# Patient Record
Sex: FEMALE | Race: WHITE | NOT HISPANIC OR LATINO | Employment: FULL TIME | URBAN - NONMETROPOLITAN AREA
[De-identification: names, ages, dates, MRNs, and addresses within clinical notes are randomized per-mention and may not be internally consistent; named-entity substitution may affect disease eponyms.]

---

## 2019-12-26 ENCOUNTER — APPOINTMENT (OUTPATIENT)
Dept: OCCUPATIONAL MEDICINE | Facility: OTHER | Age: 34
End: 2019-12-26

## 2019-12-26 PROCEDURE — 99000 SPECIMEN HANDLING OFFICE-LAB: CPT

## 2021-03-18 NOTE — PROGRESS NOTES
"    Assessment & Plan     1. Encounter to establish care    2. Pilar cyst  - GENERAL SURG ADULT REFERRAL for excision.     3. Near syncope  - EKG 12-lead complete w/read - (Clinic Performed) - normal  - CBC with platelets and differential - normal  - TSH with free T4 reflex - pending  - Comprehensive metabolic panel - pending  - *UA reflex to Microscopic and Culture - [facility]/[place] - normal  - HCG Qual, Urine ([id number]) - negative  - Declined zio patch for now.        Review of the result(s) of each unique test - EKG, UA, CBC, urine pregnancy.        Follow-up as needed       Kelsey Parra, NP  [facility] - [place]SE Jones is a 36 year old who presents for the following health issues     HPI     She is here today to establish care.  She has not had a primary in the past.  Her 2 concerns are lesion on scalp and near syncope 2 weeks ago.        New Patient/Transfer of Care  Concern - bump on head   Onset: 12 years ago   Description: getting larger  Intensity: gets irritated   Progression of Symptoms:  worsening  Accompanying Signs & Symptoms: firm irritation   Previous history of similar problem: no   Precipitating factors:        Worsened by: wearing shield   Alleviating factors:        Improved by: nothing   Therapies tried and outcome: None      2 weeks ago she had an episode where she felt very light headed, almost passed out.  She did not fall, did not lose consciousness just very lightheaded and vision closing in.  The whole episode only lasted a few seconds.          Review of Systems   Constitutional, HEENT, cardiovascular, pulmonary, gi and gu systems are negative, except as otherwise noted.      Objective    /72 (BP Location: Left arm, Patient Position: Chair, Cuff Size: Adult Large)   Pulse 77   Temp 97.2  F (36.2  C) (Tympanic)   Resp 16   Ht 1.854 m (6' 1\")   Wt 85.9 kg (189 lb 4.8 oz)   SpO2 97%   BMI 24.98 kg/m    Body mass index is 24.98 " kg/m .  Physical Exam   GENERAL: healthy, alert and no distress  HENT: ear canals and TM's normal, nose and mouth without ulcers or lesions  NECK: no adenopathy, no asymmetry, masses, or scars, thyroid normal to palpation and no carotid bruits  RESP: lungs clear to auscultation - no rales, rhonchi or wheezes  CV: regular rate and rhythm, normal S1 S2, no S3 or S4, no murmur, click or rub, no peripheral edema and peripheral pulses strong  ABDOMEN: soft, nontender, no hepatosplenomegaly, no masses and bowel sounds normal  MS: no gross musculoskeletal defects noted, no edema  NEURO: Normal strength and tone, mentation intact and speech normal  PSYCH: mentation appears normal, affect normal/bright        Results for orders placed or performed in visit on 03/25/21   CBC with platelets and differential     Status: None   Result Value Ref Range    WBC 6.6 4.0 - 11.0 10e9/L    RBC Count 4.72 3.8 - 5.2 10e12/L    Hemoglobin 14.7 11.7 - 15.7 g/dL    Hematocrit 43.1 35.0 - 47.0 %    MCV 91 78 - 100 fl    MCH 31.1 26.5 - 33.0 pg    MCHC 34.1 31.5 - 36.5 g/dL    RDW 12.8 10.0 - 15.0 %    Platelet Count 305 150 - 450 10e9/L    % Neutrophils 54.9 %    % Lymphocytes 31.9 %    % Monocytes 8.2 %    % Eosinophils 4.1 %    % Basophils 0.9 %    Absolute Neutrophil 3.6 1.6 - 8.3 10e9/L    Absolute Lymphocytes 2.1 0.8 - 5.3 10e9/L    Absolute Monocytes 0.5 0.0 - 1.3 10e9/L    Absolute Eosinophils 0.3 0.0 - 0.7 10e9/L    Absolute Basophils 0.1 0.0 - 0.2 10e9/L    Diff Method Automated Method    TSH with free T4 reflex     Status: None   Result Value Ref Range    TSH 1.35 0.40 - 4.00 mU/L   Comprehensive metabolic panel     Status: None   Result Value Ref Range    Sodium 136 133 - 144 mmol/L    Potassium 3.6 3.4 - 5.3 mmol/L    Chloride 102 94 - 109 mmol/L    Carbon Dioxide 27 20 - 32 mmol/L    Anion Gap 7 3 - 14 mmol/L    Glucose 88 70 - 99 mg/dL    Urea Nitrogen 12 7 - 30 mg/dL    Creatinine 0.89 0.52 - 1.04 mg/dL    GFR Estimate 82 >60  mL/min/[1.73_m2]    GFR Estimate If Black >90 >60 mL/min/[1.73_m2]    Calcium 9.1 8.5 - 10.1 mg/dL    Bilirubin Total 0.9 0.2 - 1.3 mg/dL    Albumin 4.3 3.4 - 5.0 g/dL    Protein Total 7.6 6.8 - 8.8 g/dL    Alkaline Phosphatase 51 40 - 150 U/L    ALT 22 0 - 50 U/L    AST 9 0 - 45 U/L   *UA reflex to Microscopic and Culture - [place] [facility]     Status: Abnormal    Specimen: Midstream Urine   Result Value Ref Range    Color Urine Yellow     Appearance Urine Clear     Glucose Urine Negative NEG^Negative mg/dL    Bilirubin Urine Negative NEG^Negative    Ketones Urine Negative NEG^Negative mg/dL    Specific Gravity Urine 1.010 1.003 - 1.035    Blood Urine Negative NEG^Negative    pH Urine 7.0 5.0 - 7.0 pH    Protein Albumin Urine Negative NEG^Negative mg/dL    Urobilinogen Urine 0.2 0.2 - 1.0 EU/dL    Nitrite Urine Negative NEG^Negative    Leukocyte Esterase Urine Trace (A) NEG^Negative    Source Midstream Urine    HCG Qual, Urine ([id number])     Status: None   Result Value Ref Range    HCG Qual Urine Negative NEG^Negative   Urine Microscopic     Status: None   Result Value Ref Range    WBC Urine 0 - 5 OTO5^0 - 5 /HPF    RBC Urine O - 2 OTO2^O - 2 /HPF    Squamous Epithelial /LPF Urine Few FEW^Few /LPF          EKG Interpretation:      Interpreted by Kelsey Parra NP    Symptoms at time of EKG: None   Rhythm: Normal sinus   Rate: 68  Axis: Normal  Ectopy: None  Conduction: Normal  ST Segments/ T Waves: No ST-T wave changes and No acute ischemic changes  Q Waves: None  Comparison to prior: No old EKG available    Clinical Impression: no acute changes

## 2021-03-25 ENCOUNTER — OFFICE VISIT (OUTPATIENT)
Dept: FAMILY MEDICINE | Facility: OTHER | Age: 36
End: 2021-03-25
Attending: NURSE PRACTITIONER
Payer: COMMERCIAL

## 2021-03-25 VITALS
TEMPERATURE: 97.2 F | OXYGEN SATURATION: 97 % | WEIGHT: 189.3 LBS | HEART RATE: 77 BPM | DIASTOLIC BLOOD PRESSURE: 72 MMHG | HEIGHT: 72 IN | BODY MASS INDEX: 25.64 KG/M2 | SYSTOLIC BLOOD PRESSURE: 116 MMHG | RESPIRATION RATE: 16 BRPM

## 2021-03-25 DIAGNOSIS — L72.11 PILAR CYST: ICD-10-CM

## 2021-03-25 DIAGNOSIS — Z76.89 ENCOUNTER TO ESTABLISH CARE: Primary | ICD-10-CM

## 2021-03-25 DIAGNOSIS — R55 NEAR SYNCOPE: ICD-10-CM

## 2021-03-25 LAB
ALBUMIN SERPL-MCNC: 4.3 G/DL (ref 3.4–5)
ALBUMIN UR-MCNC: NEGATIVE MG/DL
ALP SERPL-CCNC: 51 U/L (ref 40–150)
ALT SERPL W P-5'-P-CCNC: 22 U/L (ref 0–50)
ANION GAP SERPL CALCULATED.3IONS-SCNC: 7 MMOL/L (ref 3–14)
APPEARANCE UR: CLEAR
AST SERPL W P-5'-P-CCNC: 9 U/L (ref 0–45)
BASOPHILS # BLD AUTO: 0.1 10E9/L (ref 0–0.2)
BASOPHILS NFR BLD AUTO: 0.9 %
BILIRUB SERPL-MCNC: 0.9 MG/DL (ref 0.2–1.3)
BILIRUB UR QL STRIP: NEGATIVE
BUN SERPL-MCNC: 12 MG/DL (ref 7–30)
CALCIUM SERPL-MCNC: 9.1 MG/DL (ref 8.5–10.1)
CHLORIDE SERPL-SCNC: 102 MMOL/L (ref 94–109)
CO2 SERPL-SCNC: 27 MMOL/L (ref 20–32)
COLOR UR AUTO: YELLOW
CREAT SERPL-MCNC: 0.89 MG/DL (ref 0.52–1.04)
DIFFERENTIAL METHOD BLD: NORMAL
EOSINOPHIL # BLD AUTO: 0.3 10E9/L (ref 0–0.7)
EOSINOPHIL NFR BLD AUTO: 4.1 %
ERYTHROCYTE [DISTWIDTH] IN BLOOD BY AUTOMATED COUNT: 12.8 % (ref 10–15)
GFR SERPL CREATININE-BSD FRML MDRD: 82 ML/MIN/{1.73_M2}
GLUCOSE SERPL-MCNC: 88 MG/DL (ref 70–99)
GLUCOSE UR STRIP-MCNC: NEGATIVE MG/DL
HCG UR QL: NEGATIVE
HCT VFR BLD AUTO: 43.1 % (ref 35–47)
HGB BLD-MCNC: 14.7 G/DL (ref 11.7–15.7)
HGB UR QL STRIP: NEGATIVE
KETONES UR STRIP-MCNC: NEGATIVE MG/DL
LEUKOCYTE ESTERASE UR QL STRIP: ABNORMAL
LYMPHOCYTES # BLD AUTO: 2.1 10E9/L (ref 0.8–5.3)
LYMPHOCYTES NFR BLD AUTO: 31.9 %
MCH RBC QN AUTO: 31.1 PG (ref 26.5–33)
MCHC RBC AUTO-ENTMCNC: 34.1 G/DL (ref 31.5–36.5)
MCV RBC AUTO: 91 FL (ref 78–100)
MONOCYTES # BLD AUTO: 0.5 10E9/L (ref 0–1.3)
MONOCYTES NFR BLD AUTO: 8.2 %
NEUTROPHILS # BLD AUTO: 3.6 10E9/L (ref 1.6–8.3)
NEUTROPHILS NFR BLD AUTO: 54.9 %
NITRATE UR QL: NEGATIVE
NON-SQ EPI CELLS #/AREA URNS LPF: NORMAL /LPF
PH UR STRIP: 7 PH (ref 5–7)
PLATELET # BLD AUTO: 305 10E9/L (ref 150–450)
POTASSIUM SERPL-SCNC: 3.6 MMOL/L (ref 3.4–5.3)
PROT SERPL-MCNC: 7.6 G/DL (ref 6.8–8.8)
RBC # BLD AUTO: 4.72 10E12/L (ref 3.8–5.2)
RBC #/AREA URNS AUTO: NORMAL /HPF
SODIUM SERPL-SCNC: 136 MMOL/L (ref 133–144)
SOURCE: ABNORMAL
SP GR UR STRIP: 1.01 (ref 1–1.03)
TSH SERPL DL<=0.005 MIU/L-ACNC: 1.35 MU/L (ref 0.4–4)
UROBILINOGEN UR STRIP-ACNC: 0.2 EU/DL (ref 0.2–1)
WBC # BLD AUTO: 6.6 10E9/L (ref 4–11)
WBC #/AREA URNS AUTO: NORMAL /HPF

## 2021-03-25 PROCEDURE — 80050 GENERAL HEALTH PANEL: CPT | Performed by: NURSE PRACTITIONER

## 2021-03-25 PROCEDURE — 81025 URINE PREGNANCY TEST: CPT | Performed by: NURSE PRACTITIONER

## 2021-03-25 PROCEDURE — 93000 ELECTROCARDIOGRAM COMPLETE: CPT | Performed by: INTERNAL MEDICINE

## 2021-03-25 PROCEDURE — 36415 COLL VENOUS BLD VENIPUNCTURE: CPT | Performed by: NURSE PRACTITIONER

## 2021-03-25 PROCEDURE — 81001 URINALYSIS AUTO W/SCOPE: CPT | Performed by: NURSE PRACTITIONER

## 2021-03-25 PROCEDURE — 99203 OFFICE O/P NEW LOW 30 MIN: CPT | Performed by: NURSE PRACTITIONER

## 2021-03-25 SDOH — HEALTH STABILITY: MENTAL HEALTH: HOW OFTEN DO YOU HAVE 6 OR MORE DRINKS ON ONE OCCASION?: NOT ASKED

## 2021-03-25 SDOH — HEALTH STABILITY: MENTAL HEALTH: HOW MANY STANDARD DRINKS CONTAINING ALCOHOL DO YOU HAVE ON A TYPICAL DAY?: NOT ASKED

## 2021-03-25 SDOH — HEALTH STABILITY: MENTAL HEALTH: HOW OFTEN DO YOU HAVE A DRINK CONTAINING ALCOHOL?: MONTHLY OR LESS

## 2021-03-25 ASSESSMENT — ANXIETY QUESTIONNAIRES
2. NOT BEING ABLE TO STOP OR CONTROL WORRYING: NOT AT ALL
3. WORRYING TOO MUCH ABOUT DIFFERENT THINGS: NOT AT ALL
6. BECOMING EASILY ANNOYED OR IRRITABLE: NOT AT ALL
5. BEING SO RESTLESS THAT IT IS HARD TO SIT STILL: NOT AT ALL
1. FEELING NERVOUS, ANXIOUS, OR ON EDGE: NOT AT ALL
7. FEELING AFRAID AS IF SOMETHING AWFUL MIGHT HAPPEN: NOT AT ALL
GAD7 TOTAL SCORE: 0
4. TROUBLE RELAXING: NOT AT ALL
IF YOU CHECKED OFF ANY PROBLEMS ON THIS QUESTIONNAIRE, HOW DIFFICULT HAVE THESE PROBLEMS MADE IT FOR YOU TO DO YOUR WORK, TAKE CARE OF THINGS AT HOME, OR GET ALONG WITH OTHER PEOPLE: NOT DIFFICULT AT ALL

## 2021-03-25 ASSESSMENT — MIFFLIN-ST. JEOR: SCORE: 1671.54

## 2021-03-25 ASSESSMENT — PAIN SCALES - GENERAL: PAINLEVEL: NO PAIN (0)

## 2021-03-25 ASSESSMENT — PATIENT HEALTH QUESTIONNAIRE - PHQ9: SUM OF ALL RESPONSES TO PHQ QUESTIONS 1-9: 2

## 2021-03-25 NOTE — NURSING NOTE
"Chief Complaint   Patient presents with     Establish Care       Initial /72 (BP Location: Left arm, Patient Position: Chair, Cuff Size: Adult Large)   Pulse 77   Temp 97.2  F (36.2  C) (Tympanic)   Resp 16   Ht 1.854 m (6' 1\")   Wt 85.9 kg (189 lb 4.8 oz)   SpO2 97%   BMI 24.98 kg/m   Estimated body mass index is 24.98 kg/m  as calculated from the following:    Height as of this encounter: 1.854 m (6' 1\").    Weight as of this encounter: 85.9 kg (189 lb 4.8 oz).  Medication Reconciliation: complete  Pamela M. Lechevalier, LPN    "

## 2021-03-25 NOTE — PATIENT INSTRUCTIONS
Assessment & Plan     1. Encounter to establish care      2. Pilar cyst  - GENERAL SURG ADULT REFERRAL    3. Near syncope  - EKG 12-lead complete w/read - (Clinic Performed) - normal  - CBC with platelets and differential - normal  - TSH with free T4 reflex - pending  - Comprehensive metabolic panel - pending  - *UA reflex to Microscopic and Culture - [facility]/[place] - normal  - HCG Qual, Urine ([id number]) - negative  - Declined zio patch for now.        Review of the result(s) of each unique test - EKG, UA, CBC      Follow-up as needed       Kelsey Parra NP  [facility] - [place] [facility]

## 2021-03-26 ASSESSMENT — ANXIETY QUESTIONNAIRES: GAD7 TOTAL SCORE: 0

## 2021-04-13 ENCOUNTER — OFFICE VISIT (OUTPATIENT)
Dept: SURGERY | Facility: OTHER | Age: 36
End: 2021-04-13
Attending: NURSE PRACTITIONER
Payer: COMMERCIAL

## 2021-04-13 ENCOUNTER — PREP FOR PROCEDURE (OUTPATIENT)
Dept: SURGERY | Facility: OTHER | Age: 36
End: 2021-04-13

## 2021-04-13 VITALS
TEMPERATURE: 97.8 F | OXYGEN SATURATION: 98 % | BODY MASS INDEX: 25.06 KG/M2 | HEART RATE: 78 BPM | DIASTOLIC BLOOD PRESSURE: 72 MMHG | RESPIRATION RATE: 16 BRPM | WEIGHT: 185 LBS | HEIGHT: 72 IN | SYSTOLIC BLOOD PRESSURE: 122 MMHG

## 2021-04-13 DIAGNOSIS — L98.9 SCALP LESION: Primary | ICD-10-CM

## 2021-04-13 DIAGNOSIS — L72.11 PILAR CYST: ICD-10-CM

## 2021-04-13 DIAGNOSIS — Z01.818 PREOP TESTING: Primary | ICD-10-CM

## 2021-04-13 PROCEDURE — 99203 OFFICE O/P NEW LOW 30 MIN: CPT | Performed by: SURGERY

## 2021-04-13 ASSESSMENT — PAIN SCALES - GENERAL: PAINLEVEL: NO PAIN (0)

## 2021-04-13 ASSESSMENT — MIFFLIN-ST. JEOR: SCORE: 1652.03

## 2021-04-13 NOTE — NURSING NOTE
"Chief Complaint   Patient presents with     Consult     referred by Bautista Asif for cyst on head for 12 years getting bigger       Initial /72 (BP Location: Right arm, Cuff Size: Adult Regular)   Pulse 78   Temp 97.8  F (36.6  C) (Tympanic)   Resp 16   Ht 1.854 m (6' 1\")   Wt 83.9 kg (185 lb)   SpO2 98%   BMI 24.41 kg/m   Estimated body mass index is 24.41 kg/m  as calculated from the following:    Height as of this encounter: 1.854 m (6' 1\").    Weight as of this encounter: 83.9 kg (185 lb).  Medication Reconciliation: complete  Vero Samayoa LPN  "

## 2021-04-13 NOTE — PATIENT INSTRUCTIONS
Thank you for allowing our surgical team to participate in your care. Please review the instructions below.   If you have questions, you may contact us at the any of the following numbers:     [facility] Coordinator: [phone]  Clinic Surgery Nurse ([place]): [phone]  Hospital Surgery Education Nurse: [phone]    You are scheduled for: Excision of Scalp Lesion with Dr. Bahena on 5/10/21.  Admit Time: Hospital Surgery will call you the day before your procedure by 5pm with your arrival time.   If your surgery is on Monday, expect a call on Friday.   If you are not contacted before 5PM, please call admitting at [phone].   After hours or on weekends, please call [phone] to postpone.   Call the clinic surgery nurse if you become ill within 1-2 weeks of your procedure to reschedule.   This includes fever, chills, sore throat, cough, chest congestion, runny nose, or any other symptom of any other illness.     COVID-19 test is needed 4-5 days before procedure in the morning. This testing is done in the [facility] on the [place] side of [facility] (weekdays and weekends) or at the [facility] through door #3 (weekdays only).  Follow the signage for parking and bring your mobile phone (if you have one) to call the phone number ([phone]) on the sign outside the testing site for check-in. Stay in your vehicle until you are directed to enter. If you do not have a cell phone, please call the nurse for instructions on checking in.   This has been scheduled for 5/6/21 at 10:30 at the [place] site.      Please call the Hospital Surgery Education Nurse at [phone] 1 week prior to your procedure and have a medication list ready.   Do not take Aspirin or other NSAIDS (Ibuprofen, Motrin, Aleve, Celebrex, Naproxen, etc), vitamins/supplements 7 days before your surgery unless you have been otherwise directed.   If you are prescribed a daily 81mg Aspirin, you may continue this.    If you are prescribed blood thinners or insulin, please contact your primary care provider for instructions.    Do not have anything to eat or drink after midnight the night before your surgery (or 8 hours prior to surgery), except clear liquids (water, clear juice, clear broth, plain coffee or tea without cream or milk) up until 2 hours prior to arrival time, and then nothing by mouth.   Do not eat, drink, chew gum, suck on hard candy, or smoke after 2 hours prior to arrival. You can brush your teeth.   If you are directed to take any medications, take them with a tiny sip of water.   If you use an inhaler, bring it with you.  Arrive in clean, comfortable clothing.   Do not wear any jewelry, make-up, nail polish, lotions, hair products, or perfumes.   [place] in hospital admitting through the [place] entrance.  A responsible adult must be available to drive you home and stay with you for 24 hours at home. If you need to take a taxi or the bus, you must have another responsible adult to ride with you. Your procedure will be cancelled if you do not have a responsible adult .     Return to clinic for a postop appointment 2 week(s) from your surgery date.

## 2021-04-13 NOTE — H&P (VIEW-ONLY)
"CLINIC NOTE - CONSULT  4/13/2021    Patient:Karen Lanier    Referring Physician: Kelsey Parra    Reason for Referral: Mass on scalp    This is a 36 year old female with a mass on the scalp.  The mass is getting larger.  The mass has not been infected in the past.  The mass has not drained.  The patient does desire excision.     Past Medical History:  History reviewed. No pertinent past medical history.    Past Surgical History:  History reviewed. No pertinent surgical history.    Family History History:  Family History   Problem Relation Age of Onset     Throat cancer Father      Melanoma Maternal Grandfather      Coronary Artery Disease Paternal Grandfather      Heart Disease Paternal Grandfather      Brain Cancer Brother        History of Tobacco Use:  History   Smoking Status     Never Smoker   Smokeless Tobacco     Never Used       Current Medications:  No current outpatient medications on file.       Allergies:  No Known Allergies    ROS:  Pertinent items are noted in HPI.  All other systems are negative.    PHYSICAL EXAM:     Vital signs: /72 (BP Location: Right arm, Cuff Size: Adult Regular)   Pulse 78   Temp 97.8  F (36.6  C) (Tympanic)   Resp 16   Ht 1.854 m (6' 1\")   Wt 83.9 kg (185 lb)   SpO2 98%   BMI 24.41 kg/m     Weight: [unfilled]   BMI: Body mass index is 24.41 kg/m .   General: Normal, healthy, cooperative   Skin: no jaundice   HEENT: PERRLA and EOMI   Neck: supple   Lungs: clear to auscultation   CV: Regular rate and rhythm without murmer   Abdominal: abdomen is soft without significant tenderness, masses, organomegaly or guarding   Extremities: No cyanosis, clubbing or edema noted bilaterally in Upper and Lower Extremities   Neurological: without deficit     On the anterior scalp there is a 5 x 5 cm soft mobile lesion consistent with a pilar cyst.    ASSESSMENT: 36 year old female with a mass on the scalp.    PLAN: Discussed options with the patient.  Will plan on " taking the patient to the OR for surgical excision.      The risks, benefits, and alternatives to the planned procedure were fully discussed with the patient and/or the patient's representative(s). The risks of bleeding, infection, death, missing pathology, the need for additional procedures intra-operatively, the possible need for intra-operative consults, the possible need for transfusion therapy, cardiopulmonary compromise, the possible need for additional surgery for a complication were discussed with the patient and/or the patient's representative(s). The patient's and/or patient's representative(s) questions were addressed and answered. Informed consent was obtained from the patient and/or the patient's representative(s). The patient and/or the patient's representative(s) consent to proceed.

## 2021-04-24 ENCOUNTER — HEALTH MAINTENANCE LETTER (OUTPATIENT)
Age: 36
End: 2021-04-24

## 2021-05-03 ENCOUNTER — ANESTHESIA EVENT (OUTPATIENT)
Dept: SURGERY | Facility: HOSPITAL | Age: 36
End: 2021-05-03
Payer: COMMERCIAL

## 2021-05-03 NOTE — ANESTHESIA PREPROCEDURE EVALUATION
Anesthesia Pre-Procedure Evaluation    Patient: Karen Lanier   MRN: [id number] : 1985        Preoperative Diagnosis: Scalp lesion [L98.9]   Procedure : Procedure(s):  Excision of Scalp Lesion     No past medical history on file.   No past surgical history on file.   No Known Allergies   Social History     Tobacco Use     Smoking status: Never Smoker     Smokeless tobacco: Never Used   Substance Use Topics     Alcohol Use     Frequency: Monthly or less      Wt Readings from Last 1 Encounters:   21 83.9 kg (185 lb)        Anesthesia Evaluation   Pt has not had prior anesthetic         ROS/MED HX  ENT/Pulmonary:  - neg pulmonary ROS     Neurologic:  - neg neurologic ROS     Cardiovascular:  - neg cardiovascular ROS     METS/Exercise Tolerance: >4 METS    Hematologic:  - neg hematologic  ROS     Musculoskeletal:       GI/Hepatic:  - neg GI/hepatic ROS     Renal/Genitourinary:  - neg Renal ROS     Endo:  - neg endo ROS     Psychiatric/Substance Use:  - neg psychiatric ROS     Infectious Disease:  - neg infectious disease ROS     Malignancy: Comment: Mass on scalp - neg malignancy ROS     Other:  - neg other ROS          Physical Exam    Airway        Mallampati: I   TM distance: > 3 FB   Neck ROM: full   Mouth opening: > 3 cm    Respiratory Devices and Support         Dental  no notable dental history         Cardiovascular   cardiovascular exam normal       Rhythm and rate: regular and normal     Pulmonary   pulmonary exam normal        breath sounds clear to auscultation           OUTSIDE LABS:  CBC:   Lab Results   Component Value Date    WBC 6.6 2021    HGB 14.7 2021    HCT 43.1 2021     2021     BMP:   Lab Results   Component Value Date     2021    POTASSIUM 3.6 2021    CHLORIDE 102 2021    CO2 27 2021    BUN 12 2021    CR 0.89 2021    GLC 88 2021     COAGS: No results found for: PTT, INR, FIBR  POC:   Lab Results    Component Value Date    HCG Negative 03/25/2021     HEPATIC:   Lab Results   Component Value Date    ALBUMIN 4.3 03/25/2021    PROTTOTAL 7.6 03/25/2021    ALT 22 03/25/2021    AST 9 03/25/2021    ALKPHOS 51 03/25/2021    BILITOTAL 0.9 03/25/2021     OTHER:   Lab Results   Component Value Date    DANIEL 9.1 03/25/2021    TSH 1.35 03/25/2021       Anesthesia Plan    ASA Status:  1   NPO Status:  NPO Appropriate    Anesthesia Type: MAC.     - Reason for MAC: straight local not clinically adequate   Induction: Intravenous.   Maintenance: TIVA.        Consents    Anesthesia Plan(s) and associated risks, benefits, and realistic alternatives discussed. Questions answered and patient/representative(s) expressed understanding.     - Discussed with:  Patient         Postoperative Care            Comments:    Consult note 4/13/21  Risks and benefits of MAC anesthetic discussed including dental damage, aspiration, loss of airway, conversion to general anesthetic, CV complications, MI, stroke, death. Pt wishes to proceed.             VITOR Nelson CRNA

## 2021-05-06 ENCOUNTER — OFFICE VISIT (OUTPATIENT)
Dept: FAMILY MEDICINE | Facility: OTHER | Age: 36
End: 2021-05-06
Attending: NURSE PRACTITIONER
Payer: COMMERCIAL

## 2021-05-06 DIAGNOSIS — Z01.818 PREOP TESTING: ICD-10-CM

## 2021-05-06 LAB
SARS-COV-2 RNA RESP QL NAA+PROBE: NORMAL
SPECIMEN SOURCE: NORMAL

## 2021-05-06 PROCEDURE — U0005 INFEC AGEN DETEC AMPLI PROBE: HCPCS | Performed by: FAMILY MEDICINE

## 2021-05-06 PROCEDURE — U0003 INFECTIOUS AGENT DETECTION BY NUCLEIC ACID (DNA OR RNA); SEVERE ACUTE RESPIRATORY SYNDROME CORONAVIRUS 2 (SARS-COV-2) (CORONAVIRUS DISEASE [COVID-19]), AMPLIFIED PROBE TECHNIQUE, MAKING USE OF HIGH THROUGHPUT TECHNOLOGIES AS DESCRIBED BY CMS-2020-01-R: HCPCS | Performed by: FAMILY MEDICINE

## 2021-05-07 LAB
LABORATORY COMMENT REPORT: NORMAL
SARS-COV-2 RNA RESP QL NAA+PROBE: NEGATIVE
SPECIMEN SOURCE: NORMAL

## 2021-05-10 ENCOUNTER — APPOINTMENT (OUTPATIENT)
Dept: LAB | Facility: HOSPITAL | Age: 36
End: 2021-05-10
Attending: NURSE PRACTITIONER
Payer: COMMERCIAL

## 2021-05-10 ENCOUNTER — ANESTHESIA (OUTPATIENT)
Dept: SURGERY | Facility: HOSPITAL | Age: 36
End: 2021-05-10
Payer: COMMERCIAL

## 2021-05-10 ENCOUNTER — HOSPITAL ENCOUNTER (OUTPATIENT)
Facility: HOSPITAL | Age: 36
Discharge: HOME | End: 2021-05-10
Attending: SURGERY | Admitting: SURGERY
Payer: COMMERCIAL

## 2021-05-10 VITALS
OXYGEN SATURATION: 100 % | BODY MASS INDEX: 25.06 KG/M2 | HEART RATE: 66 BPM | RESPIRATION RATE: 16 BRPM | SYSTOLIC BLOOD PRESSURE: 118 MMHG | HEIGHT: 72 IN | TEMPERATURE: 97.2 F | DIASTOLIC BLOOD PRESSURE: 74 MMHG | WEIGHT: 185 LBS

## 2021-05-10 DIAGNOSIS — L98.9 SCALP LESION: ICD-10-CM

## 2021-05-10 LAB — HCG UR QL: NEGATIVE

## 2021-05-10 PROCEDURE — 250N000011 HC RX IP 250 OP 636: Performed by: NURSE ANESTHETIST, CERTIFIED REGISTERED

## 2021-05-10 PROCEDURE — 999N000141 HC STATISTIC PRE-PROCEDURE NURSING ASSESSMENT: Performed by: SURGERY

## 2021-05-10 PROCEDURE — 11422 EXC H-F-NK-SP B9+MARG 1.1-2: CPT | Performed by: SURGERY

## 2021-05-10 PROCEDURE — 250N000009 HC RX 250: Performed by: SURGERY

## 2021-05-10 PROCEDURE — 250N000011 HC RX IP 250 OP 636: Performed by: SURGERY

## 2021-05-10 PROCEDURE — 88305 TISSUE EXAM BY PATHOLOGIST: CPT | Mod: TC | Performed by: SURGERY

## 2021-05-10 PROCEDURE — 21013 EXC FACE TUM DEEP < 2 CM: CPT | Performed by: NURSE ANESTHETIST, CERTIFIED REGISTERED

## 2021-05-10 PROCEDURE — 81025 URINE PREGNANCY TEST: CPT | Performed by: NURSE ANESTHETIST, CERTIFIED REGISTERED

## 2021-05-10 PROCEDURE — 250N000013 HC RX MED GY IP 250 OP 250 PS 637: Performed by: NURSE ANESTHETIST, CERTIFIED REGISTERED

## 2021-05-10 PROCEDURE — 360N000075 HC SURGERY LEVEL 2, PER MIN: Performed by: SURGERY

## 2021-05-10 PROCEDURE — 258N000003 HC RX IP 258 OP 636: Performed by: NURSE ANESTHETIST, CERTIFIED REGISTERED

## 2021-05-10 PROCEDURE — 272N000001 HC OR GENERAL SUPPLY STERILE: Performed by: SURGERY

## 2021-05-10 PROCEDURE — 250N000009 HC RX 250: Performed by: NURSE ANESTHETIST, CERTIFIED REGISTERED

## 2021-05-10 PROCEDURE — 710N000012 HC RECOVERY PHASE 2, PER MINUTE: Performed by: SURGERY

## 2021-05-10 PROCEDURE — 370N000017 HC ANESTHESIA TECHNICAL FEE, PER MIN: Performed by: SURGERY

## 2021-05-10 RX ORDER — CEFAZOLIN SODIUM 2 G/100ML
2 INJECTION, SOLUTION INTRAVENOUS SEE ADMIN INSTRUCTIONS
Status: DISCONTINUED | OUTPATIENT
Start: 2021-05-10 | End: 2021-05-10 | Stop reason: HOSPADM

## 2021-05-10 RX ORDER — ACETAMINOPHEN 325 MG/1
650 TABLET ORAL ONCE
Status: COMPLETED | OUTPATIENT
Start: 2021-05-10 | End: 2021-05-10

## 2021-05-10 RX ORDER — BACITRACIN ZINC 500 [USP'U]/G
OINTMENT TOPICAL PRN
Status: DISCONTINUED | OUTPATIENT
Start: 2021-05-10 | End: 2021-05-10 | Stop reason: HOSPADM

## 2021-05-10 RX ORDER — MEPERIDINE HYDROCHLORIDE 25 MG/ML
12.5 INJECTION INTRAMUSCULAR; INTRAVENOUS; SUBCUTANEOUS
Status: DISCONTINUED | OUTPATIENT
Start: 2021-05-10 | End: 2021-05-10 | Stop reason: HOSPADM

## 2021-05-10 RX ORDER — FENTANYL CITRATE 50 UG/ML
INJECTION, SOLUTION INTRAMUSCULAR; INTRAVENOUS PRN
Status: DISCONTINUED | OUTPATIENT
Start: 2021-05-10 | End: 2021-05-10

## 2021-05-10 RX ORDER — ONDANSETRON 4 MG/1
4 TABLET, ORALLY DISINTEGRATING ORAL EVERY 30 MIN PRN
Status: DISCONTINUED | OUTPATIENT
Start: 2021-05-10 | End: 2021-05-10 | Stop reason: HOSPADM

## 2021-05-10 RX ORDER — NALOXONE HYDROCHLORIDE 0.4 MG/ML
0.2 INJECTION, SOLUTION INTRAMUSCULAR; INTRAVENOUS; SUBCUTANEOUS
Status: DISCONTINUED | OUTPATIENT
Start: 2021-05-10 | End: 2021-05-10 | Stop reason: HOSPADM

## 2021-05-10 RX ORDER — ONDANSETRON 2 MG/ML
4 INJECTION INTRAMUSCULAR; INTRAVENOUS EVERY 30 MIN PRN
Status: DISCONTINUED | OUTPATIENT
Start: 2021-05-10 | End: 2021-05-10 | Stop reason: HOSPADM

## 2021-05-10 RX ORDER — SODIUM CHLORIDE, SODIUM LACTATE, POTASSIUM CHLORIDE, CALCIUM CHLORIDE 600; 310; 30; 20 MG/100ML; MG/100ML; MG/100ML; MG/100ML
INJECTION, SOLUTION INTRAVENOUS CONTINUOUS
Status: DISCONTINUED | OUTPATIENT
Start: 2021-05-10 | End: 2021-05-10 | Stop reason: HOSPADM

## 2021-05-10 RX ORDER — KETOROLAC TROMETHAMINE 30 MG/ML
30 INJECTION, SOLUTION INTRAMUSCULAR; INTRAVENOUS EVERY 6 HOURS PRN
Status: DISCONTINUED | OUTPATIENT
Start: 2021-05-10 | End: 2021-05-10 | Stop reason: HOSPADM

## 2021-05-10 RX ORDER — LIDOCAINE 40 MG/G
CREAM TOPICAL
Status: DISCONTINUED | OUTPATIENT
Start: 2021-05-10 | End: 2021-05-10 | Stop reason: HOSPADM

## 2021-05-10 RX ORDER — NALOXONE HYDROCHLORIDE 0.4 MG/ML
0.4 INJECTION, SOLUTION INTRAMUSCULAR; INTRAVENOUS; SUBCUTANEOUS
Status: DISCONTINUED | OUTPATIENT
Start: 2021-05-10 | End: 2021-05-10 | Stop reason: HOSPADM

## 2021-05-10 RX ORDER — PROPOFOL 10 MG/ML
INJECTION, EMULSION INTRAVENOUS CONTINUOUS PRN
Status: DISCONTINUED | OUTPATIENT
Start: 2021-05-10 | End: 2021-05-10

## 2021-05-10 RX ORDER — CEFAZOLIN SODIUM 2 G/100ML
2 INJECTION, SOLUTION INTRAVENOUS
Status: DISCONTINUED | OUTPATIENT
Start: 2021-05-10 | End: 2021-05-10 | Stop reason: HOSPADM

## 2021-05-10 RX ADMIN — SODIUM CHLORIDE, POTASSIUM CHLORIDE, SODIUM LACTATE AND CALCIUM CHLORIDE: 600; 310; 30; 20 INJECTION, SOLUTION INTRAVENOUS at 07:45

## 2021-05-10 RX ADMIN — ACETAMINOPHEN 650 MG: 325 TABLET, FILM COATED ORAL at 09:31

## 2021-05-10 RX ADMIN — MIDAZOLAM 1 MG: 1 INJECTION INTRAMUSCULAR; INTRAVENOUS at 08:03

## 2021-05-10 RX ADMIN — CEFAZOLIN SODIUM 2 G: 2 INJECTION, SOLUTION INTRAVENOUS at 08:03

## 2021-05-10 RX ADMIN — MIDAZOLAM 1 MG: 1 INJECTION INTRAMUSCULAR; INTRAVENOUS at 08:09

## 2021-05-10 RX ADMIN — FENTANYL CITRATE 50 MCG: 50 INJECTION, SOLUTION INTRAMUSCULAR; INTRAVENOUS at 08:09

## 2021-05-10 RX ADMIN — FENTANYL CITRATE 50 MCG: 50 INJECTION, SOLUTION INTRAMUSCULAR; INTRAVENOUS at 08:03

## 2021-05-10 RX ADMIN — PROPOFOL 100 MCG/KG/MIN: 10 INJECTION, EMULSION INTRAVENOUS at 08:08

## 2021-05-10 RX ADMIN — KETOROLAC TROMETHAMINE 30 MG: 30 INJECTION, SOLUTION INTRAMUSCULAR at 09:07

## 2021-05-10 ASSESSMENT — MIFFLIN-ST. JEOR: SCORE: 1652.03

## 2021-05-10 NOTE — ANESTHESIA CARE TRANSFER NOTE
Patient: Karen Lanier    Procedure(s):  Excision of Scalp Lesion    Diagnosis: Scalp lesion [L98.9]  Diagnosis Additional Information: No value filed.    Anesthesia Type:   MAC     Note:    Oropharynx: spontaneously breathing  Level of Consciousness: awake and drowsy  Oxygen Supplementation: nasal cannula    Independent Airway: airway patency satisfactory and stable  Dentition: dentition unchanged  Vital Signs Stable: post-procedure vital signs reviewed and stable  Report to RN Given: handoff report given  Patient transferred to: Phase II    Handoff Report: Identifed the Patient, Identified the Reponsible Provider, Reviewed the pertinent medical history, Discussed the surgical course, Reviewed Intra-OP anesthesia mangement and issues during anesthesia, Set expectations for post-procedure period and Allowed opportunity for questions and acknowledgement of understanding      Vitals: (Last set prior to Anesthesia Care Transfer)  CRNA VITALS  5/10/2021 0805 - 5/10/2021 0843      5/10/2021             Resp Rate (set):  8        Electronically Signed By: VITOR Aden CRNA  May 10, 2021  8:43 AM

## 2021-05-10 NOTE — ANESTHESIA POSTPROCEDURE EVALUATION
Patient: Karen Lanier    Procedure(s):  Excision of Scalp Lesion    Diagnosis:Scalp lesion [L98.9]  Diagnosis Additional Information: No value filed.    Anesthesia Type:  MAC    Note:  Disposition: Outpatient   Postop Pain Control: Uneventful            Sign Out: Well controlled pain   PONV: No   Neuro/Psych: Uneventful            Sign Out: Acceptable/Baseline neuro status   Airway/Respiratory: Uneventful            Sign Out: Acceptable/Baseline resp. status   CV/Hemodynamics: Uneventful            Sign Out: Acceptable CV status; No obvious hypovolemia; No obvious fluid overload   Other NRE: NONE   DID A NON-ROUTINE EVENT OCCUR? No           Last vitals:  Vitals:    05/10/21 0900 05/10/21 0915 05/10/21 0930   BP: 108/67 121/81 118/74   Pulse: 61 59 66   Resp: 16 16 16   Temp:   97.2  F (36.2  C)   SpO2: 100% 100% 100%       Last vitals prior to Anesthesia Care Transfer:  CRNA VITALS  5/10/2021 0805 - 5/10/2021 0905      5/10/2021             Resp Rate (set):  8          Electronically Signed By: VITOR Bermudez CRNA  May 10, 2021  11:52 AM

## 2021-05-10 NOTE — OR NURSING
Patient and responsible adult given discharge instructions with no questions regarding instructions. Crystal score 20. Pain level 3/10.  Discharged from unit via ambulation, declined wheelchair. Patient discharged to home.

## 2021-05-10 NOTE — OP NOTE
REPORT OF OPERATION  DATE OF PROCEDURE: 5/10/2021    PATIENT: Karen Lanier    SURGERY PREFORMED: Excision of mass scalp    PREOPERATIVE DIAGNOSIS: scalp mass.    POSTOPERATIVE DIAGNOSIS: Same    SURGEON: Erik Bahena MD    ASSISTANTS: None    ANESTHESIA: Monitored Anesthesia Care    COMPLICATIONS: None apparent    TRANSFUSIONS: None    TISSUE TO PATHOLOGY: scalp mass to pathology for pathological diagnosises    FINDINGS: 2x2cm mass of the frontal region of the scalp consistent with an EDIC.    INDICATIONS: This is a 36 year old female with a mass on the scalp.  The patient will be taken to the operating room for excisional biopsy.    DESCRIPTIONS OF PROCEDURE IN DETAIL: After consent was obtained the patient was taken to the operative suite and juan david in the supine position.  The patient was identified and the correct patient was confirmed.  Monitored Anesthesia Care was administered by anesthesia.  The patient was sterilely prepped and draped in the usual fashion.  A time out was preformed verifying the correct patient and the correct procedure.  The entire operative team was in agreement.  All necessary equipment and supplies were in the room.    After the lesion was identified and the area was anesthestized with local infiltrative anesthesia.  The skin was then sharply entered and the dissection was carried down until isolation of the lesion.  The lesion was dissected away from the surrounding tissues and removed in it entirety.  The lesion was sent to pathology for pathological diagnosises.  Hemostatis was assured.  The skin was closed with staples.  Sterile dressings were applied.  All needle, sponge and instrument counts were correct x 2. The patient was awakened wand takened to the recovery room in stable condition tolerating th procedure well.

## 2021-05-12 LAB — COPATH REPORT: NORMAL

## 2021-05-25 ENCOUNTER — OFFICE VISIT (OUTPATIENT)
Dept: SURGERY | Facility: OTHER | Age: 36
End: 2021-05-25
Attending: NURSE PRACTITIONER
Payer: COMMERCIAL

## 2021-05-25 VITALS
BODY MASS INDEX: 24.41 KG/M2 | SYSTOLIC BLOOD PRESSURE: 100 MMHG | WEIGHT: 185 LBS | HEART RATE: 67 BPM | DIASTOLIC BLOOD PRESSURE: 74 MMHG | OXYGEN SATURATION: 99 % | TEMPERATURE: 97.6 F

## 2021-05-25 DIAGNOSIS — Z98.890 STATUS POST EXCISIONAL BIOPSY: Primary | ICD-10-CM

## 2021-05-25 PROCEDURE — 99212 OFFICE O/P EST SF 10 MIN: CPT | Performed by: NURSE PRACTITIONER

## 2021-05-25 ASSESSMENT — PAIN SCALES - GENERAL: PAINLEVEL: MILD PAIN (2)

## 2021-05-25 NOTE — NURSING NOTE
"Chief Complaint   Patient presents with     Surgical Followup     5/10 sclap lesion excision       Initial /74 (BP Location: Right arm, Patient Position: Sitting, Cuff Size: Adult Regular)   Pulse 67   Temp 97.6  F (36.4  C) (Tympanic)   Wt 83.9 kg (185 lb)   SpO2 99%   BMI 24.41 kg/m   Estimated body mass index is 24.41 kg/m  as calculated from the following:    Height as of 5/10/21: 1.854 m (6' 1\").    Weight as of this encounter: 83.9 kg (185 lb).  Medication Reconciliation: complete  Milly Kim LPN  "

## 2021-05-25 NOTE — PROGRESS NOTES
"CLINIC NOTE - POST-OP SURGERY  5/25/2021    Patient:Devon Valles    Procedure: excision of scalp mass    This is a 36 year old female who is 2 weeks s/p excision of scalp mass.  The patient has no complaints today.     Current Medications:  No current outpatient medications on file.       Allergies:  No Known Allergies    PHYSICAL EXAM:   Vital signs: /74 (BP Location: Right arm, Patient Position: Sitting, Cuff Size: Adult Regular)   Pulse 67   Temp 97.6  F (36.4  C) (Tympanic)   Wt 83.9 kg (185 lb)   SpO2 99%   BMI 24.41 kg/m     BMI: Body mass index is 24.41 kg/m .   General: Normal, healthy, cooperative, in no acute distress, alert   Lungs: respirations are non-labored   Abdominal: non-distended   Wounds:  Well healed surgical scars consistent with her operation.       PATHOLOGY:  Copath Report   Date Value Ref Range Status   05/10/2021   Final    Patient Name: DEVON VALLES  MR#: [id number]  Specimen #:   Collected: 5/10/2021  Received: 5/11/2021  Reported: 5/12/2021 16:40  Ordering Phy(s): GAY LEDBETTER    For improved result formatting, select 'View Enhanced Report Format' under   Linked Documents section.    SPECIMEN(S):  Scalp lesion    FINAL DIAGNOSIS:  Skin, scalp, lesion, excision:  - Benign keratinous cyst (consistent with pilar cyst) with associated   inflammation and hemorrhage.    I have personally reviewed all specimens and/or slides, including the   listed special stains, and used them  with my medical judgement to determine or confirm the final diagnosis.    Electronically signed out by:    Epi Brand M.D.    GROSS:  A single specimen container with formalin is received labeled with the   patient's name, date of birth, and  medical record number. Information on the requisition slip, container, and   associated labels is confirmed.    The specimen is designated \"scalp lesion\" consisting of a 2.5 x 1.5 x 1 cm   cyst with surrounding yellow  lobulated " adipose and an overlying 3 x 1.5 cm hairbearing skin with a.    Sectioning reveals the cyst is filled  with brown to yellow semisolid material.  A representative cross-section   is submitted in one cassette.  (Dictated by: Cheyenne Meeks 5/11/2021 08:13 AM)    MICROSCOPIC:  Sections demonstrate a subcutaneous cystic structure lined by stratified   squamous epithelium (without an  identifiable granular layer) and containing compact acellular keratinous   debris.  There is associated  inflammation and hemorrhage.  There is no malignancy.    CPT Codes  A: 76843-JR3    COLLECTION SITE:  Client: [facility]  Location: [facility] (B)    The technical component of this testing was completed at the [facility], with the professional component completed   at the [facility], [address] [place] [id number] ([phone])           ASSESSMENT:    36 year old female who is 2 weeks s/p excision of scalp mass.  Doing well.     PLAN:   Staples were removed to the incisional site without problems.     Follow-up as needed

## 2021-06-03 ENCOUNTER — NURSE TRIAGE (OUTPATIENT)
Dept: FAMILY MEDICINE | Facility: OTHER | Age: 36
End: 2021-06-03

## 2021-06-03 ENCOUNTER — OFFICE VISIT (OUTPATIENT)
Dept: FAMILY MEDICINE | Facility: OTHER | Age: 36
End: 2021-06-03
Attending: NURSE PRACTITIONER
Payer: OTHER MISCELLANEOUS

## 2021-06-03 VITALS
SYSTOLIC BLOOD PRESSURE: 113 MMHG | BODY MASS INDEX: 25.73 KG/M2 | OXYGEN SATURATION: 98 % | TEMPERATURE: 99.1 F | DIASTOLIC BLOOD PRESSURE: 62 MMHG | HEIGHT: 72 IN | WEIGHT: 190 LBS | HEART RATE: 75 BPM
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
## 2021-06-03 DIAGNOSIS — Z77.21 EXPOSURE TO BODY FLUID DUE TO ACCIDENTAL HYPODERMIC NEEDLESTICK INJURY: Primary | ICD-10-CM

## 2021-06-03 PROCEDURE — 87389 HIV-1 AG W/HIV-1&-2 AB AG IA: CPT | Performed by: NURSE PRACTITIONER

## 2021-06-03 PROCEDURE — 36415 COLL VENOUS BLD VENIPUNCTURE: CPT | Performed by: NURSE PRACTITIONER

## 2021-06-03 PROCEDURE — 99213 OFFICE O/P EST LOW 20 MIN: CPT | Performed by: NURSE PRACTITIONER

## 2021-06-03 PROCEDURE — 86803 HEPATITIS C AB TEST: CPT | Performed by: NURSE PRACTITIONER

## 2021-06-03 PROCEDURE — 86706 HEP B SURFACE ANTIBODY: CPT | Performed by: NURSE PRACTITIONER

## 2021-06-03 ASSESSMENT — MIFFLIN-ST. JEOR: SCORE: 1674.71

## 2021-06-03 ASSESSMENT — PAIN SCALES - GENERAL: PAINLEVEL: NO PAIN (0)

## 2021-06-03 NOTE — PROGRESS NOTES
"    Assessment & Plan     1. Exposure to body fluid due to accidental hypodermic needlestick injury  - Hepatitis C Screen Reflex to HCV RNA Quant and Genotype  - HIV Antigen Antibody Combo  - Hepatitis B Surface Antibody       BMI:   Estimated body mass index is 25.07 kg/m  as calculated from the following:    Height as of this encounter: 1.854 m (6' 1\").    Weight as of this encounter: 86.2 kg (190 lb).     Repeat testing in 3 months and 6 months - will confirm testing schedule with [facility] Monday.        Kelsey Parra, NP  [facility]    Subjective   Beryl is a 36 year old who presents for the following health issues     HPI     Occupational Visit     SUBJECTIVE:  Karen Lanier, 36 year old, female is seen for new evaluation and treatment of occupational injury. Date of injury is right index finger.    Linked Episodes   Type: Episode: Status: Noted: Resolved: Last update: Updated by:   WORK COMP [email or web address] [id number] Active 5/31/2021  6/3/2021  4:10 PM Colleen Perez LPN      Comments:need stick        Needle stick injury at dialysis center today.  Her patient is being tested, he is Hep C negative.        No Known Allergies      Review of Systems:  Constitutional, HEENT, cardiovascular, pulmonary, gi and gu systems are negative, except as otherwise noted.      OBJECTIVE:  Vitals:    06/03/21 1610   BP: 113/62   Pulse: 75   Temp: 99.1  F (37.3  C)   SpO2: 98%          Exam:  GENERAL: healthy, alert, well nourished, well hydrated, no distress  MS: extremities- no gross deformities noted, no edema  PSYCH: Alert and oriented times 3; speech- coherent , normal rate and volume; able to articulate logical thoughts, able to abstract reason, no tangential thoughts, no hallucinations or delusions, affect- normal      Labs: No results found for this or any previous visit (from the past 24 hour(s)).      "

## 2021-06-03 NOTE — TELEPHONE ENCOUNTER
"    Reason for Disposition    [1] NEEDLESTICK (or other wound from sharp object) AND [2] occurred while at work    Additional Information    [1] EXPOSURE to a body fluid AND [2] from needlestick or a wound from a sharp object    Negative: [1] SOURCE person is HIV POSITIVE AND [2] needlestick within past 72 hours    Negative: [1] SOURCE person is HEPATITIS B POSITIVE AND [2] needlestick within past 7 days AND [3] EXPOSED person NOT fully immunized against Hepatitis B (or does not know)    Negative: [1] Puncture is on the head, neck, chest, abdomen or overlying a joint AND [2] could be deep    Negative: Sensation of something still in the wound  (i.e., needle broke off)    Negative: Skin is split open or gaping (or length > 1/2 inch or 12 mm)    Negative: Epinephrine (such as from Epi-Pen) injected into hand, finger, foot, or toe    Negative: Epinephrine injected into safe site (not into hand, finger, foot, or toe)    Negative: [1] SOURCE person is HIGHER RISK (e.g., custodial inmate, injection drug user) AND [2] needlestick within past 72 hours    Negative: Patient sounds very sick or weak to the triager    Negative: [1] SOURCE person is UNKNOWN (e.g., needle in garbage) AND [2] needlestick within past 72 hours    Answer Assessment - Initial Assessment Questions  1. DESCRIPTION: \"Please describe what happened?\" (e.g., spitting, splash, touching)      Accidental needle stick on 6/1  2. TYPE AND AMOUNT OF FLUID:  \"What type of body fluid was it and how much? (e.g., saliva, blood, vomit; a few drops, a splash)       Pulled dialysis needle out of patient and needle cover did not lock completely  3. ROUTE OF EXPOSURE: \"What part of your body was exposed?\" (e.g., eye, mouth, intact skin)      R hand- tip of pointer finger  4. DATE-TIME of EXPOSURE: \"When did this exposure occur?\"      6/1  5. SOURCE PERSON HIV POSITIVE: \"Is the other person HIV positive?\" (e.g., yes, no, unknown)      Not sure, drawing labs Monday for HIV  6. " "SOURCE PERSON HEPATITIS POSITIVE: \"Is the other person Hepatitis positive?\" (e.g., yes, no, unknown)      no    Protocols used: [id number], BLOOD AND BODY FLUID EXPOSURE - [id number]      "

## 2021-06-06 LAB
HBV SURFACE AB SERPL IA-ACNC: >1000 M[IU]/ML
HCV AB SERPL QL IA: NONREACTIVE
HIV 1+2 AB+HIV1 P24 AG SERPL QL IA: NONREACTIVE

## 2021-10-09 ENCOUNTER — HEALTH MAINTENANCE LETTER (OUTPATIENT)
Age: 36
End: 2021-10-09

## 2021-11-02 NOTE — PROGRESS NOTES
"  Assessment & Plan     1. Other eczema  Avoid irritating triggers.   - triamcinolone (KENALOG) 0.1 % external cream; Apply topically 3 times daily as needed for irritation  Dispense: 80 g; Refill: 1    2. Need for vaccination  Tetanus updated today.        BMI:   Estimated body mass index is 26.52 kg/m  as calculated from the following:    Height as of this encounter: 1.854 m (6' 1\").    Weight as of this encounter: 91.2 kg (201 lb).     Follow-up as needed     Kelsey Parra, NP  [facility]    Selma Cordoba is a 36 year old who presents for the following health issues     HPI     Pt is aware she is due for px with pap and TDAP/TD    Concern - Dry Patch on right Hand  Onset: 1-1.5 year   Description: dry irritated patch on hand, with scabbed area   Intensity: mild  Progression of Symptoms:  waxing and waning  Accompanying Signs & Symptoms: itching, redness dry,  Previous history of similar problem: on going   Precipitating factors:        Worsened by: dry cold weather, frequent hand washing, and    Alleviating factors:        Improved by: nothing really has resolved the issue   Therapies tried and outcome: has used lotion mostly, has used hydrocortisone cream intermittently, stopped using vinyl gloved at work . Has not changed use of .        Review of Systems   Constitutional, HEENT, cardiovascular, pulmonary, gi and gu systems are negative, except as otherwise noted.      Objective    /72 (BP Location: Left arm, Patient Position: Chair, Cuff Size: Adult Regular)   Pulse 75   Temp 98.4  F (36.9  C) (Tympanic)   Ht 1.854 m (6' 1\")   Wt 91.2 kg (201 lb)   LMP 10/23/2021   SpO2 98%   BMI 26.52 kg/m    Body mass index is 26.52 kg/m .  Physical Exam   GENERAL: healthy, alert and no distress  MS: no gross musculoskeletal defects noted, no edema  SKIN: dorsal aspect right hand, 3x4 cm erythematous lesion with scaly surface consistent with eczema.  No " drainage noted.    PSYCH: mentation appears normal, affect normal/bright

## 2021-11-04 ENCOUNTER — OFFICE VISIT (OUTPATIENT)
Dept: FAMILY MEDICINE | Facility: OTHER | Age: 36
End: 2021-11-04
Attending: NURSE PRACTITIONER
Payer: COMMERCIAL

## 2021-11-04 VITALS
BODY MASS INDEX: 27.22 KG/M2 | SYSTOLIC BLOOD PRESSURE: 110 MMHG | OXYGEN SATURATION: 98 % | DIASTOLIC BLOOD PRESSURE: 72 MMHG | TEMPERATURE: 98.4 F | HEIGHT: 72 IN | WEIGHT: 201 LBS | HEART RATE: 75 BPM

## 2021-11-04 DIAGNOSIS — L30.8 OTHER ECZEMA: Primary | ICD-10-CM

## 2021-11-04 DIAGNOSIS — Z23 NEED FOR VACCINATION: ICD-10-CM

## 2021-11-04 PROCEDURE — 99213 OFFICE O/P EST LOW 20 MIN: CPT | Mod: 25 | Performed by: NURSE PRACTITIONER

## 2021-11-04 PROCEDURE — 90471 IMMUNIZATION ADMIN: CPT | Performed by: NURSE PRACTITIONER

## 2021-11-04 PROCEDURE — 90714 TD VACC NO PRESV 7 YRS+ IM: CPT | Performed by: NURSE PRACTITIONER

## 2021-11-04 RX ORDER — TRIAMCINOLONE ACETONIDE 1 MG/G
CREAM TOPICAL 3 TIMES DAILY PRN
Qty: 80 G | Refills: 1 | Status: SHIPPED | OUTPATIENT
Start: 2021-11-04 | End: 2023-05-18

## 2021-11-04 ASSESSMENT — PAIN SCALES - GENERAL: PAINLEVEL: NO PAIN (0)

## 2021-11-04 ASSESSMENT — MIFFLIN-ST. JEOR: SCORE: 1724.61

## 2021-11-04 NOTE — PATIENT INSTRUCTIONS
"  Assessment & Plan     1. Other eczema  Avoid irritating triggers.   - triamcinolone (KENALOG) 0.1 % external cream; Apply topically 3 times daily as needed for irritation  Dispense: 80 g; Refill: 1    2. Need for vaccination  Tetanus updated today.      BMI:   Estimated body mass index is 26.52 kg/m  as calculated from the following:    Height as of this encounter: 1.854 m (6' 1\").    Weight as of this encounter: 91.2 kg (201 lb).     Follow-up as needed     Kelsey Parra, NP  [facility]    "

## 2021-11-10 ENCOUNTER — TELEPHONE (OUTPATIENT)
Dept: FAMILY MEDICINE | Facility: OTHER | Age: 36
End: 2021-11-10
Payer: COMMERCIAL

## 2021-11-10 NOTE — PROGRESS NOTES
"  Assessment & Plan     1. Menorrhagia with regular cycle  - US Pelvis Complete without Transvaginal    2. Bleeding after intercourse  - US Pelvis Complete without Transvaginal         BMI:   Estimated body mass index is 26.52 kg/m  as calculated from the following:    Height as of this encounter: 1.854 m (6' 1\").    Weight as of this encounter: 91.2 kg (201 lb).     Will notify of results as they are returned.       Kelsey Parra NP  [facility] JOSE Cordoba is a 36 year old who presents for the following health issues     HPI     Menstrual Concern  Onset/Duration: 11/8/21  Description:   Duration of bleeding episodes: 4 days  Frequency of periods: (1st day of one to 1st day of next):  every 28 days  Describe bleeding/flow:   Clots: yes, small  Number of pads/day: 3        Cramping: mild  Accompanying Signs & Symptoms:  Lightheadedness: no  Temperature intolerance: no  Nosebleeds/Easy bruising: no  Vaginal Discharge: no  Acne: no  Change in body hair: no  History:  Patient's last menstrual period was 10/23/2021.  Previous normal periods: YES  Contraceptive use: NO  Sexually active: YES  Any bleeding after intercourse: YES, no pain with intercourse.    Abnormal PAP Smears: no - has never had one - pap is scheduled for 11/18/2021.    Precipitating or alleviating factors: None  Therapies tried and outcome: None        Review of Systems   Constitutional, HEENT, cardiovascular, pulmonary, gi and gu systems are negative, except as otherwise noted.      Objective    /66 (BP Location: Right arm, Patient Position: Chair, Cuff Size: Adult Large)   Pulse 77   Temp 99  F (37.2  C) (Tympanic)   Resp 16   Ht 1.854 m (6' 1\")   Wt 91.2 kg (201 lb)   LMP 10/23/2021   SpO2 98%   BMI 26.52 kg/m    Body mass index is 26.52 kg/m .  Physical Exam   GENERAL: healthy, alert and no distress  MS: no gross musculoskeletal defects noted, no edema  PSYCH: mentation appears normal, affect " normal/bright

## 2021-11-10 NOTE — TELEPHONE ENCOUNTER
9:09 AM    Reason for Call: OVERBOOK    Patient is having the following symptoms: Having a heavy period she is in the middle of the cycle and the heaviness started yesterday and she has never had a period like this before and she would like to be seen. Beryl is driving home from [place] and she will be in [facility] around 2:45pm this afternoon.    The patient is requesting an appointment for this afternoon with Bautista Asif.    Was an appointment offered for this call? No  If yes : Appointment type              Date    Preferred method for responding to this message: Telephone Call  What is your phone number ? [phone]    If we cannot reach you directly, may we leave a detailed response at the number you provided? Yes    Can this message wait until your PCP/provider returns, if unavailable today? Juli Goodwin

## 2021-11-11 ENCOUNTER — OFFICE VISIT (OUTPATIENT)
Dept: FAMILY MEDICINE | Facility: OTHER | Age: 36
End: 2021-11-11
Attending: NURSE PRACTITIONER
Payer: COMMERCIAL

## 2021-11-11 VITALS
SYSTOLIC BLOOD PRESSURE: 112 MMHG | HEIGHT: 72 IN | HEART RATE: 77 BPM | TEMPERATURE: 99 F | BODY MASS INDEX: 27.22 KG/M2 | OXYGEN SATURATION: 98 % | WEIGHT: 201 LBS | DIASTOLIC BLOOD PRESSURE: 66 MMHG | RESPIRATION RATE: 16 BRPM

## 2021-11-11 DIAGNOSIS — N93.0 BLEEDING AFTER INTERCOURSE: ICD-10-CM

## 2021-11-11 DIAGNOSIS — N92.0 MENORRHAGIA WITH REGULAR CYCLE: Primary | ICD-10-CM

## 2021-11-11 PROCEDURE — 99213 OFFICE O/P EST LOW 20 MIN: CPT | Performed by: NURSE PRACTITIONER

## 2021-11-11 ASSESSMENT — MIFFLIN-ST. JEOR: SCORE: 1724.61

## 2021-11-11 ASSESSMENT — PAIN SCALES - GENERAL: PAINLEVEL: MILD PAIN (3)

## 2021-11-11 NOTE — NURSING NOTE
"Chief Complaint   Patient presents with     Vaginal Bleeding       Initial /66 (BP Location: Right arm, Patient Position: Chair, Cuff Size: Adult Large)   Pulse 77   Temp 99  F (37.2  C) (Tympanic)   Resp 16   Ht 1.854 m (6' 1\")   Wt 91.2 kg (201 lb)   LMP 10/23/2021   SpO2 98%   BMI 26.52 kg/m   Estimated body mass index is 26.52 kg/m  as calculated from the following:    Height as of this encounter: 1.854 m (6' 1\").    Weight as of this encounter: 91.2 kg (201 lb).  Medication Reconciliation: complete  Pamela M. Lechevalier, LPN    "

## 2021-11-13 NOTE — PROGRESS NOTES
SUBJECTIVE:   CC: Karen Lanier is an 36 year old woman who presents for preventive health visit.       Patient has been advised of split billing requirements and indicates understanding: Yes  HPI    She is feeling well, no new concerns today.      Today's PHQ-2 Score:   PHQ-2 ( 1999 Pfizer) 11/11/2021   Q1: Little interest or pleasure in doing things 0   Q2: Feeling down, depressed or hopeless 0   PHQ-2 Score 0       Abuse: Current or Past (Physical, Sexual or Emotional) - No  Do you feel safe in your environment? Yes        Social History     Tobacco Use     Smoking status: Never Smoker     Smokeless tobacco: Never Used   Substance Use Topics     Alcohol use: Not on file     If you drink alcohol do you typically have >3 drinks per day or >7 drinks per week? Not applicable    Alcohol Use 11/18/2021   Prescreen: >3 drinks/day or >7 drinks/week? Not Applicable       Reviewed orders with patient.  Reviewed health maintenance and updated orders accordingly - No      Breast Cancer Screening:  Any new diagnosis of family breast, ovarian, or bowel cancer? No    FHS-7: No flowsheet data found.    Patient under 40 years of age: Routine Mammogram Screening not recommended.   Pertinent mammograms are reviewed under the imaging tab.    History of abnormal Pap smear: pap due today, first ever.       Reviewed and updated as needed this visit by clinical staff  Tobacco  Allergies  Meds             Reviewed and updated as needed this visit by Provider                   Review of Systems  CONSTITUTIONAL: NEGATIVE for fever, chills, change in weight  INTEGUMENTARU/SKIN: NEGATIVE for worrisome rashes, moles or lesions  EYES: NEGATIVE for vision changes or irritation  ENT: NEGATIVE for ear, mouth and throat problems  RESP: NEGATIVE for significant cough or SOB  BREAST: NEGATIVE for masses, tenderness or discharge  CV: NEGATIVE for chest pain, palpitations or peripheral edema  GI: NEGATIVE for nausea, abdominal pain,  "heartburn, or change in bowel habits  : NEGATIVE for unusual urinary or vaginal symptoms. Periods are regular.  MUSCULOSKELETAL: NEGATIVE for significant arthralgias or myalgia  NEURO: NEGATIVE for weakness, dizziness or paresthesias  PSYCHIATRIC: NEGATIVE for changes in mood or affect     OBJECTIVE:   /74 (BP Location: Right arm, Patient Position: Chair, Cuff Size: Adult Large)   Pulse 66   Temp 98.2  F (36.8  C) (Tympanic)   Ht 1.854 m (6' 1\")   Wt 91.6 kg (202 lb)   LMP 10/23/2021   SpO2 97%   BMI 26.65 kg/m    Physical Exam  GENERAL: healthy, alert and no distress  EYES: Eyes grossly normal to inspection, PERRL and conjunctivae and sclerae normal  HENT: ear canals and TM's normal, nose and mouth without ulcers or lesions  NECK: no adenopathy, no asymmetry, masses, or scars and thyroid normal to palpation  RESP: lungs clear to auscultation - no rales, rhonchi or wheezes  CV: regular rate and rhythm, normal S1 S2, no S3 or S4, no murmur, click or rub, no peripheral edema and peripheral pulses strong  ABDOMEN: soft, nontender, no hepatosplenomegaly, no masses and bowel sounds normal   (female): normal female external genitalia, normal urethral meatus, vaginal mucosa, normal cervix/adnexa/uterus without masses or discharge  MS: no gross musculoskeletal defects noted, no edema  SKIN: no suspicious lesions or rashes  NEURO: Normal strength and tone, mentation intact and speech normal  PSYCH: mentation appears normal, affect normal/bright        ASSESSMENT/PLAN:   1. Routine general medical examination at a health care facility  - A pap thin layer screen with  HPV - recommended age 30 - 65 years  - Lipid Profile  - TSH with free T4 reflex  - Basic metabolic panel    2. Screening for malignant neoplasm of cervix  - A pap thin layer screen with  HPV - recommended age 30 - 65 years      Patient has been advised of split billing requirements and indicates understanding: Yes  COUNSELING:  Reviewed " "preventive health counseling, as reflected in patient instructions       Regular exercise       Healthy diet/nutrition       Vision screening       Hearing screening       Immunizations          Estimated body mass index is 26.65 kg/m  as calculated from the following:    Height as of this encounter: 1.854 m (6' 1\").    Weight as of this encounter: 91.6 kg (202 lb).    Weight management plan: Discussed healthy diet and exercise guidelines    She reports that she has never smoked. She has never used smokeless tobacco.      Counseling Resources:  ATP IV Guidelines  Pooled Cohorts Equation Calculator  Breast Cancer Risk Calculator  BRCA-Related Cancer Risk Assessment: FHS-7 Tool  FRAX Risk Assessment  ICSI Preventive Guidelines  Dietary Guidelines for Americans, 2010  USDA's MyPlate  ASA Prophylaxis  Lung CA Screening    Kelsey Parra, NP  [facility]  "

## 2021-11-18 ENCOUNTER — OFFICE VISIT (OUTPATIENT)
Dept: FAMILY MEDICINE | Facility: OTHER | Age: 36
End: 2021-11-18
Attending: NURSE PRACTITIONER
Payer: COMMERCIAL

## 2021-11-18 VITALS
OXYGEN SATURATION: 97 % | SYSTOLIC BLOOD PRESSURE: 132 MMHG | BODY MASS INDEX: 27.36 KG/M2 | HEIGHT: 72 IN | DIASTOLIC BLOOD PRESSURE: 74 MMHG | WEIGHT: 202 LBS | HEART RATE: 66 BPM | TEMPERATURE: 98.2 F

## 2021-11-18 DIAGNOSIS — Z00.00 ROUTINE GENERAL MEDICAL EXAMINATION AT A HEALTH CARE FACILITY: Primary | ICD-10-CM

## 2021-11-18 DIAGNOSIS — Z12.4 SCREENING FOR MALIGNANT NEOPLASM OF CERVIX: ICD-10-CM

## 2021-11-18 LAB
ANION GAP SERPL CALCULATED.3IONS-SCNC: 6 MMOL/L (ref 3–14)
BUN SERPL-MCNC: 7 MG/DL (ref 7–30)
CALCIUM SERPL-MCNC: 8.8 MG/DL (ref 8.5–10.1)
CHLORIDE BLD-SCNC: 107 MMOL/L (ref 94–109)
CHOLEST SERPL-MCNC: 238 MG/DL
CO2 SERPL-SCNC: 25 MMOL/L (ref 20–32)
CREAT SERPL-MCNC: 0.93 MG/DL (ref 0.52–1.04)
FASTING STATUS PATIENT QL REPORTED: NO
GFR SERPL CREATININE-BSD FRML MDRD: 79 ML/MIN/1.73M2
GLUCOSE BLD-MCNC: 83 MG/DL (ref 70–99)
HDLC SERPL-MCNC: 64 MG/DL
HOLD SPECIMEN: NORMAL
LDLC SERPL CALC-MCNC: 145 MG/DL
NONHDLC SERPL-MCNC: 174 MG/DL
POTASSIUM BLD-SCNC: 3.8 MMOL/L (ref 3.4–5.3)
SODIUM SERPL-SCNC: 138 MMOL/L (ref 133–144)
TRIGL SERPL-MCNC: 147 MG/DL
TSH SERPL DL<=0.005 MIU/L-ACNC: 1.28 MU/L (ref 0.4–4)

## 2021-11-18 PROCEDURE — G0145 SCR C/V CYTO,THINLAYER,RESCR: HCPCS | Performed by: NURSE PRACTITIONER

## 2021-11-18 PROCEDURE — 99395 PREV VISIT EST AGE 18-39: CPT | Performed by: NURSE PRACTITIONER

## 2021-11-18 PROCEDURE — 36415 COLL VENOUS BLD VENIPUNCTURE: CPT | Performed by: NURSE PRACTITIONER

## 2021-11-18 PROCEDURE — 80061 LIPID PANEL: CPT | Performed by: NURSE PRACTITIONER

## 2021-11-18 PROCEDURE — 84443 ASSAY THYROID STIM HORMONE: CPT | Performed by: NURSE PRACTITIONER

## 2021-11-18 PROCEDURE — 87624 HPV HI-RISK TYP POOLED RSLT: CPT | Performed by: NURSE PRACTITIONER

## 2021-11-18 PROCEDURE — 80048 BASIC METABOLIC PNL TOTAL CA: CPT | Performed by: NURSE PRACTITIONER

## 2021-11-18 ASSESSMENT — MIFFLIN-ST. JEOR: SCORE: 1729.15

## 2021-11-18 ASSESSMENT — PAIN SCALES - GENERAL: PAINLEVEL: NO PAIN (0)

## 2021-11-18 NOTE — NURSING NOTE
"Chief Complaint   Patient presents with     Physical       Initial /74 (BP Location: Right arm, Patient Position: Chair, Cuff Size: Adult Large)   Pulse 66   Temp 98.2  F (36.8  C) (Tympanic)   Ht 1.854 m (6' 1\")   Wt 91.6 kg (202 lb)   LMP 10/23/2021   SpO2 97%   BMI 26.65 kg/m   Estimated body mass index is 26.65 kg/m  as calculated from the following:    Height as of this encounter: 1.854 m (6' 1\").    Weight as of this encounter: 91.6 kg (202 lb).  Medication Reconciliation: complete  Colleen Perez LPN    "

## 2021-11-22 LAB
BKR LAB AP GYN ADEQUACY: NORMAL
BKR LAB AP GYN INTERPRETATION: NORMAL
BKR LAB AP HPV REFLEX: NORMAL
BKR LAB AP PREVIOUS ABNORMAL: NORMAL
PATH REPORT.COMMENTS IMP SPEC: NORMAL
PATH REPORT.COMMENTS IMP SPEC: NORMAL
PATH REPORT.RELEVANT HX SPEC: NORMAL

## 2021-11-24 LAB
HUMAN PAPILLOMA VIRUS 16 DNA: NEGATIVE
HUMAN PAPILLOMA VIRUS 18 DNA: NEGATIVE
HUMAN PAPILLOMA VIRUS FINAL DIAGNOSIS: NORMAL
HUMAN PAPILLOMA VIRUS OTHER HR: NEGATIVE

## 2022-09-11 ENCOUNTER — HEALTH MAINTENANCE LETTER (OUTPATIENT)
Age: 37
End: 2022-09-11

## 2023-01-23 ENCOUNTER — HEALTH MAINTENANCE LETTER (OUTPATIENT)
Age: 38
End: 2023-01-23

## 2023-02-01 NOTE — NURSING NOTE
"Chief Complaint   Patient presents with     Derm Problem       Initial /72 (BP Location: Left arm, Patient Position: Chair, Cuff Size: Adult Regular)   Pulse 75   Temp 98.4  F (36.9  C) (Tympanic)   Ht 1.854 m (6' 1\")   Wt 91.2 kg (201 lb)   LMP 10/23/2021   SpO2 98%   BMI 26.52 kg/m   Estimated body mass index is 26.52 kg/m  as calculated from the following:    Height as of this encounter: 1.854 m (6' 1\").    Weight as of this encounter: 91.2 kg (201 lb).  Medication Reconciliation: complete  Colleen Perez LPN  "
room air

## 2023-05-17 LAB
ABO/RH(D): NORMAL
ANTIBODY SCREEN: NEGATIVE
SPECIMEN EXPIRATION DATE: NORMAL

## 2023-05-18 ENCOUNTER — PRENATAL OFFICE VISIT (OUTPATIENT)
Dept: OBGYN | Facility: OTHER | Age: 38
End: 2023-05-18
Attending: OBSTETRICS & GYNECOLOGY
Payer: COMMERCIAL

## 2023-05-18 VITALS
HEIGHT: 72 IN | BODY MASS INDEX: 28.31 KG/M2 | OXYGEN SATURATION: 99 % | SYSTOLIC BLOOD PRESSURE: 120 MMHG | WEIGHT: 209 LBS | RESPIRATION RATE: 14 BRPM | DIASTOLIC BLOOD PRESSURE: 68 MMHG | HEART RATE: 63 BPM

## 2023-05-18 DIAGNOSIS — N92.6 MISSED PERIOD: Primary | ICD-10-CM

## 2023-05-18 DIAGNOSIS — Z34.91 PREGNANCY WITH UNCERTAIN DATES IN FIRST TRIMESTER: ICD-10-CM

## 2023-05-18 LAB
ERYTHROCYTE [DISTWIDTH] IN BLOOD BY AUTOMATED COUNT: 12.4 % (ref 10–15)
HCG INTACT+B SERPL-ACNC: ABNORMAL MIU/ML
HCT VFR BLD AUTO: 40.7 % (ref 35–47)
HGB BLD-MCNC: 14.3 G/DL (ref 11.7–15.7)
MCH RBC QN AUTO: 31.7 PG (ref 26.5–33)
MCHC RBC AUTO-ENTMCNC: 35.1 G/DL (ref 31.5–36.5)
MCV RBC AUTO: 90 FL (ref 78–100)
PLATELET # BLD AUTO: 291 10E3/UL (ref 150–450)
RBC # BLD AUTO: 4.51 10E6/UL (ref 3.8–5.2)
WBC # BLD AUTO: 10.8 10E3/UL (ref 4–11)

## 2023-05-18 PROCEDURE — 76817 TRANSVAGINAL US OBSTETRIC: CPT | Performed by: OBSTETRICS & GYNECOLOGY

## 2023-05-18 PROCEDURE — 99203 OFFICE O/P NEW LOW 30 MIN: CPT | Performed by: OBSTETRICS & GYNECOLOGY

## 2023-05-18 PROCEDURE — 86900 BLOOD TYPING SEROLOGIC ABO: CPT | Performed by: OBSTETRICS & GYNECOLOGY

## 2023-05-18 PROCEDURE — 84144 ASSAY OF PROGESTERONE: CPT | Performed by: OBSTETRICS & GYNECOLOGY

## 2023-05-18 PROCEDURE — 84702 CHORIONIC GONADOTROPIN TEST: CPT | Performed by: OBSTETRICS & GYNECOLOGY

## 2023-05-18 PROCEDURE — 36415 COLL VENOUS BLD VENIPUNCTURE: CPT | Performed by: OBSTETRICS & GYNECOLOGY

## 2023-05-18 PROCEDURE — 85027 COMPLETE CBC AUTOMATED: CPT | Performed by: OBSTETRICS & GYNECOLOGY

## 2023-05-18 PROCEDURE — 86850 RBC ANTIBODY SCREEN: CPT | Performed by: OBSTETRICS & GYNECOLOGY

## 2023-05-18 PROCEDURE — 86901 BLOOD TYPING SEROLOGIC RH(D): CPT | Performed by: OBSTETRICS & GYNECOLOGY

## 2023-05-18 RX ORDER — PRENATAL VIT/IRON FUM/FOLIC AC 27MG-0.8MG
1 TABLET ORAL DAILY
COMMUNITY

## 2023-05-18 ASSESSMENT — PAIN SCALES - GENERAL: PAINLEVEL: NO PAIN (0)

## 2023-05-18 NOTE — PROGRESS NOTES
"  HPI:  Karen Garcia is a 38 year old female  Patient's last menstrual period was 2023 (exact date). at Unknown, Estimated Date of Delivery: Data Unavailable.  She denies vaginal bleeding, nausea , vomiting and abdominal pain.   No other c/o.    No past medical history on file.    Past Surgical History:   Procedure Laterality Date     EXCISE LESION HEAD N/A 5/10/2021    Procedure: Excision of Scalp Lesion;  Surgeon: Erik Bahena MD;  Location: [facility]       Allergies: Patient has no known allergies.     ROS:   Denies fever, wt loss   Neg /GI other than per HPI      EXAM:  Blood pressure 120/68, pulse 63, resp. rate 14, height 1.854 m (6' 1\"), weight 94.8 kg (209 lb), last menstrual period 2023, SpO2 99 %.   BMI= Body mass index is 27.57 kg/m .  General - pleasant female in no acute distress.  Abdomen - soft, nontender, nondistended, no hepatosplenomegaly.  Pelvic - EG: normal adult female, BUS: within normal limits,Rectovaginal - deferred.    US:    Transvaginal:Yes  Yolk sac present:Yes  CRL:  5mm  FCA present:No  EGA 6w 1d  NELY 1/10/24          ASSESSMENT/PLAN: Early intrauterine pregnancy with discordant/uncertain dates.  Possible missed AB.    Plan: hCG Quantitative Pregnancy, Progesterone, CBC         with platelets, ABO/Rh type and screen,           F/u US 1 wk for viability dates (order faxed [employer]  where she works)    1st Trimester/bleeding precautions discussed.  Patient has my card and phone number to call prn if problems in interim or if she does not here her results. .    Jeevan Oconnell MD  "

## 2023-05-18 NOTE — PROGRESS NOTES
Have you had or do you currently have:    - Diabetes? N  - Hypertension? N  - Heart disease, mitral valve prolapse, or rheumatic fever?  N  - An autoimmune disease such as lupus or rheumatoid arthritis?  N  - Kidney disease, urinary tract infection?  Y  - Epilepsy, seizures, or spells?  N  - Migraine headaches?  Y  - Any other neurological problems?  N  - Have you ever been treated for depression?  N  - Are you having problems with crying spells or loss of self-esteem?  N  - Have you ever required psychiatric care?  N  - Have you ever had hepatitis, liver disease, or jaundice?  N  - Have you ever been treated for blood clots in your veins, deep vein thrombosis, inflammation in the veins, thrombosis, phelbitis, pulmonary embolism or varicosities?  N  - Have you had excessive bleeding after surgery or dental work?  N  - Do you bleed more than other women after a cut or scratch?  N  - Do you have a history or anemia?  N  - Have you ever had thyroid problems or take thyroid medication?  N  - Do you have any endocrine problems?  N  - Have you every been in a major accident or suffered serious trauma?  N  - Within the last year, has anyone hit, slapped, kicked, or otherwise hurt you?  N  - In the last year, has anyone forced you to have sex when you didn't want to?  N  - Have you every received a blood transfusion?  N  - Would you refuse a blood transfusion if a doctor judged it to be medically necessary?  N  - Does anyone in your home smoke? N  - Do you use tobacco products?  N  - Do you drink beer, wine, or hard liquor?  N  - Do you use any of the following: marijuana, speed, cocaine, heroin, hallucinogens, or other drugs?  N  - Is your blood type RH negative?  N  - Have you ever had asthma?  N  - Have you ever had tuberculosis?  N  - Do you have any allergies to drugs or over-the-counter medications?  N  - Allergies: dust mites, aspartame, ethanol, venlafaxine hydrochloride, sertraline?  N  - Have you had any breast  problems?  N  - Have you ever breast-fed?  N  - Have you had any gynecological surgical procedures such as cervical conization, LEEP, laser treatment, cryosurgery of the cervix, or a dilatation and curettage, etc?  N  - Have you ever had any other surgical procedures?  Y  - Have you ever had any anesthetic complications?  N  - Have you ever had an abnormal pap smear?  N  - Do you have a history of abnormalities of the uterus? N  - Did your mother take ROSAURA or any other hormones when she was pregnant with you?  N  - Did it take you more than one year to become pregnant?  N  - Have you ever been evaluated or treated for infertility?  N  - Is there a history of medical problems in your family, which you feel might adversely affect your health or pregnancy?  N  - Do you have any other problems we have not asked about which you feel may be important to this pregnancy?  N    IS THE PATIENT OVER 35 YEARS OLD? Y    Symptoms since last menstrual period  - Do you currently have any of the following symptoms: abdominal pain, blood in the stool or urine, chest pain, shortness of breath, coughing or vomiting up blood, you heart is racing or skipping beats, nausea and vomiting, pain on urination, or vaginal discharge or bleeding?  YES NAUSEA     Genetic screening  Has the patient, baby's father, or anyone in either family had:  - Thalassemia (Italian, Greek, Mediterranean, or  background only) and an MCV result less than 80?  N  - Neural tube defect such as meningomyelocele, spina bifida, or anencephaly?  N  - Congential heart defect?  N  - Down's syndrome?  N  - Yasmani-Sachs disease ( [place], Cajun, [place])?  N  - Sickle cell disease or trait () ?  N  - Hemophilia or other inherited problems of blood?  N  - Muscular dystrophy?  N  - Cystic fibrosis?  N  - Joselin's chorea?  N  - Mental retardation/autism?  N   If yes, was the person tested for Fragile X?  N  - Any other inherited genetic or chromosomal  disorder?  N  - Maternal metabolic disorder (e.g. insulin- dependent diabetes, PKU)?  N  - A child with birth defects not listed above?  N  - Recurrent pregnancy loss, or a stillbirth?  N  - Has the patient had any medications/street drugs/alcohol since her last menstrual period?  N  - Does the patient or baby's father have any other genetic risk?  N    Infection history  - Have you ever been treated for tuberculosis?  N  - Have you every had a positive skin test for tuberculosis?  N  - Do you live with someone who has tuberculosis?  N  - Have you ever been exposed to tuberculosis?  N  - Do you have genital herpes?  N  - Does your partner have genital herpes?  N  - Have you had a rash or viral illness since your last period?  N  - Have you ever had gonorrhea, chlamydia, syphilis, venereal warts, trichomoniasis, pelvic inflammatory disease, or any other sexually transmitted disease?  N  - Have you had chicken pox?  N  - Have you been vaccinated against chicken pox?  N  - Have you had any other infectious diseases?  N

## 2023-05-19 LAB — PROGEST SERPL-MCNC: 18.8 NG/ML

## 2023-05-24 ENCOUNTER — TRANSFERRED RECORDS (OUTPATIENT)
Dept: HEALTH INFORMATION MANAGEMENT | Facility: CLINIC | Age: 38
End: 2023-05-24

## 2023-05-30 ENCOUNTER — TELEPHONE (OUTPATIENT)
Dept: OBGYN | Facility: OTHER | Age: 38
End: 2023-05-30

## 2023-05-30 DIAGNOSIS — O02.1 MISSED AB: Primary | ICD-10-CM

## 2023-05-30 RX ORDER — MISOPROSTOL 200 UG/1
800 TABLET ORAL DAILY
Qty: 8 TABLET | Refills: 0 | Status: SHIPPED | OUTPATIENT
Start: 2023-05-30 | End: 2023-06-01

## 2023-05-30 NOTE — TELEPHONE ENCOUNTER
5/30/2023 4:27 PM  Pt left VM, writer called pt back. Pt reports Dr. Oconnell gave a couple options regarding miscarriage. Pt called back stated she would like to avoid surgery if possible. Pt requesting medication for miscarriage. Pharmacy of choice [facility]. Pt denied any bleeding denies any cramping. Pt inquiring if there are any risks from taking the medication. Dr. Oconnell to advise.  Nishi Perez RN

## 2023-05-30 NOTE — TELEPHONE ENCOUNTER
Pt doing well, no pain/bleeding/abnormal discharge.  Wishes to try medication management as discussed at appt for missed AB.  Discussed medication (misoprostol)  use, risks, SE's.  Discussed possibility of incomplete miscarriage, need to go to ED for heavy/persistent bleeding and possible need for suction D and C.  Pt agrees with POC.  Plan Cytotec 800mg PV, may repeat in 24 hours if not completed with first dose.    F/u appt next week to ensure completion/miscarrage check.  A pos blood type.    Please call pt to schedule f/u appt next week.

## 2023-06-05 LAB
ABO/RH(D): NORMAL
ANTIBODY SCREEN: NEGATIVE
SPECIMEN EXPIRATION DATE: NORMAL

## 2023-06-06 ENCOUNTER — PRENATAL OFFICE VISIT (OUTPATIENT)
Dept: OBGYN | Facility: OTHER | Age: 38
End: 2023-06-06
Attending: OBSTETRICS & GYNECOLOGY
Payer: COMMERCIAL

## 2023-06-06 ENCOUNTER — PREP FOR PROCEDURE (OUTPATIENT)
Dept: OBGYN | Facility: OTHER | Age: 38
End: 2023-06-06

## 2023-06-06 VITALS — SYSTOLIC BLOOD PRESSURE: 125 MMHG | DIASTOLIC BLOOD PRESSURE: 80 MMHG | OXYGEN SATURATION: 99 % | HEART RATE: 85 BPM

## 2023-06-06 DIAGNOSIS — O03.4 INCOMPLETE SPONTANEOUS ABORTION: Primary | ICD-10-CM

## 2023-06-06 DIAGNOSIS — O03.4 INCOMPLETE MISCARRIAGE: Primary | ICD-10-CM

## 2023-06-06 DIAGNOSIS — Z01.818 PREOP GENERAL PHYSICAL EXAM: ICD-10-CM

## 2023-06-06 LAB
ERYTHROCYTE [DISTWIDTH] IN BLOOD BY AUTOMATED COUNT: 12.5 % (ref 10–15)
HCG INTACT+B SERPL-ACNC: 9806 MIU/ML
HCT VFR BLD AUTO: 39.4 % (ref 35–47)
HGB BLD-MCNC: 13.7 G/DL (ref 11.7–15.7)
MCH RBC QN AUTO: 31.6 PG (ref 26.5–33)
MCHC RBC AUTO-ENTMCNC: 34.8 G/DL (ref 31.5–36.5)
MCV RBC AUTO: 91 FL (ref 78–100)
PLATELET # BLD AUTO: 282 10E3/UL (ref 150–450)
RBC # BLD AUTO: 4.33 10E6/UL (ref 3.8–5.2)
WBC # BLD AUTO: 11.2 10E3/UL (ref 4–11)

## 2023-06-06 PROCEDURE — 84702 CHORIONIC GONADOTROPIN TEST: CPT | Performed by: OBSTETRICS & GYNECOLOGY

## 2023-06-06 PROCEDURE — 88305 TISSUE EXAM BY PATHOLOGIST: CPT | Mod: TC | Performed by: OBSTETRICS & GYNECOLOGY

## 2023-06-06 PROCEDURE — 85027 COMPLETE CBC AUTOMATED: CPT | Performed by: OBSTETRICS & GYNECOLOGY

## 2023-06-06 PROCEDURE — 86850 RBC ANTIBODY SCREEN: CPT | Performed by: OBSTETRICS & GYNECOLOGY

## 2023-06-06 PROCEDURE — 86901 BLOOD TYPING SEROLOGIC RH(D): CPT | Performed by: OBSTETRICS & GYNECOLOGY

## 2023-06-06 PROCEDURE — 36415 COLL VENOUS BLD VENIPUNCTURE: CPT | Performed by: OBSTETRICS & GYNECOLOGY

## 2023-06-06 PROCEDURE — 86900 BLOOD TYPING SEROLOGIC ABO: CPT | Performed by: OBSTETRICS & GYNECOLOGY

## 2023-06-06 PROCEDURE — 99214 OFFICE O/P EST MOD 30 MIN: CPT | Performed by: OBSTETRICS & GYNECOLOGY

## 2023-06-06 PROCEDURE — 88305 TISSUE EXAM BY PATHOLOGIST: CPT | Mod: 26 | Performed by: PATHOLOGY

## 2023-06-06 PROCEDURE — 76817 TRANSVAGINAL US OBSTETRIC: CPT | Performed by: OBSTETRICS & GYNECOLOGY

## 2023-06-06 RX ORDER — DOXYCYCLINE 100 MG/1
100 CAPSULE ORAL 2 TIMES DAILY
Qty: 10 CAPSULE | Refills: 0 | Status: SHIPPED | OUTPATIENT
Start: 2023-06-06 | End: 2023-10-09

## 2023-06-06 ASSESSMENT — PAIN SCALES - GENERAL: PAINLEVEL: NO PAIN (0)

## 2023-06-06 NOTE — PROGRESS NOTES
CC:  Miscarriage  HPI: Karen Garcia is a 38 year old female  .   See prior clinic notes from 23  are reviewed at today's visit.     Pt diagnosed with missed AB 6 wks and elected to proceed with medical management (Cytotec).  Pt did developed bleeding cramping/tissue passage, N/V after taking first dose.  Since then she has had periods of increased bleeding/cramping after activity.  Currently very light  bleeding/cramping.  Denies fever, abnormal discharge.        No past medical history on file.    Past Surgical History:   Procedure Laterality Date     EXCISE LESION HEAD N/A 5/10/2021    Procedure: Excision of Scalp Lesion;  Surgeon: Erik Bahena MD;  Location: [facility] OR       Family History   Problem Relation Age of Onset     Throat cancer Father      Melanoma Maternal Grandfather      Coronary Artery Disease Paternal Grandfather      Heart Disease Paternal Grandfather      Brain Cancer Brother      Current Outpatient Medications   Medication Sig Dispense Refill     doxycycline hyclate (VIBRAMYCIN) 100 MG capsule Take 1 capsule (100 mg) by mouth 2 times daily 10 capsule 0     Prenatal Vit-Fe Fumarate-FA (PRENATAL MULTIVITAMIN W/IRON) 27-0.8 MG tablet Take 1 tablet by mouth daily       Social History     Socioeconomic History     Marital status:    Tobacco Use     Smoking status: Never     Smokeless tobacco: Never   Vaping Use     Vaping status: Never Used   Substance and Sexual Activity     Drug use: Never     Sexual activity: Yes     Partners: Male       Allergies: Patient has no known allergies.    ROS:  CONSTITUTIONAL: NEGATIVE for fever, chills, change in weight  ENT/MOUTH: NEGATIVE for ear, mouth and throat problems  RESP: NEGATIVE for significant cough or SOB  CV: NEGATIVE for chest pain, palpitations or peripheral edema  GI: NEGATIVE for nausea, abdominal pain, heartburn, or change in bowel habits  : NEGATIVE for frequency, dysuria, hematuria, vaginal discharge, or irregular  bleeding  MUSCULOSKELETAL: NEGATIVE for significant arthralgias or myalgia  NEURO: NEGATIVE for weakness, dizziness or paresthesias      EXAM:  Blood pressure 125/80, pulse 85, last menstrual period 03/20/2023, SpO2 99 %.   BMI= There is no height or weight on file to calculate BMI.  General - pleasant female in no acute distress.  Neck - supple without lymphadenopathy or thyromegaly.  Lungs - clear to auscultation bilaterally.  Heart - regular rate and rhythm without murmur.  Abdomen - soft, nontender, nondistended, no hepatosplenomegaly.  Musculoskeletal - no gross deformities or edmema  Neurological - normal strength, sensation, and mental status.  Vulva: No lesions, erythema, BUS wnl  Vagina: Pink, moist mucosa with rugae,no lesions  Cervix: No lesions, no CMT.  Dilated with decidual type tissue present at external OS and clot.  Removed with ring forceps.    Uterus: Midposition, normal size and count our, mobile, NT  Adnexa: Non-palpable, NT, no masses  Anus without lesions  Ext.  neg    US:  TVUS.  Uterus with complex thickened endometrium/blood/clot.   No gestational sac/feteal pole/FCA.            ASSESSMENT/PLAN:  Incomplete AB.   Plan: Surgical Pathology Exam, doxycycline hyclate         (VIBRAMYCIN) 100 MG capsule, hCG Quantitative         Pregnancy, CBC with platelets, ABO/Rh type and         screen  Pt declines repeat Cytotec dosing and wishes to proceed with suction D and C.  However she ate recently and has no ride today.      Surgery, is thus scheduled for tomorrow and will proceed as planned.  Bleeding precautions in the interm discussed.  Reviewed goals, risks, alternatives for planned procedure.  Including risk of bleeding, infection, damage to nerves, blood vessels, bowel and bladder. Discussed recovery period and expected discomfort.. All questions were answered. Consent form reviewed and signed.  Preoperative instructions discussed.          Jeevan Oconnell MD

## 2023-06-07 ENCOUNTER — ANESTHESIA (OUTPATIENT)
Dept: SURGERY | Facility: HOSPITAL | Age: 38
End: 2023-06-07
Payer: COMMERCIAL

## 2023-06-07 ENCOUNTER — ANESTHESIA EVENT (OUTPATIENT)
Dept: SURGERY | Facility: HOSPITAL | Age: 38
End: 2023-06-07
Payer: COMMERCIAL

## 2023-06-07 ENCOUNTER — HOSPITAL ENCOUNTER (OUTPATIENT)
Facility: HOSPITAL | Age: 38
Discharge: HOME | End: 2023-06-07
Attending: OBSTETRICS & GYNECOLOGY | Admitting: OBSTETRICS & GYNECOLOGY
Payer: COMMERCIAL

## 2023-06-07 VITALS
SYSTOLIC BLOOD PRESSURE: 104 MMHG | WEIGHT: 207.2 LBS | DIASTOLIC BLOOD PRESSURE: 58 MMHG | BODY MASS INDEX: 28.06 KG/M2 | HEIGHT: 72 IN | HEART RATE: 71 BPM | TEMPERATURE: 97.3 F | RESPIRATION RATE: 16 BRPM | OXYGEN SATURATION: 95 %

## 2023-06-07 DIAGNOSIS — Z98.890 POST-OPERATIVE STATE: Primary | ICD-10-CM

## 2023-06-07 PROBLEM — O03.4 INCOMPLETE SPONTANEOUS ABORTION: Status: ACTIVE | Noted: 2023-06-07

## 2023-06-07 PROCEDURE — 88305 TISSUE EXAM BY PATHOLOGIST: CPT | Mod: 26 | Performed by: PATHOLOGY

## 2023-06-07 PROCEDURE — 710N000012 HC RECOVERY PHASE 2, PER MINUTE: Performed by: OBSTETRICS & GYNECOLOGY

## 2023-06-07 PROCEDURE — 250N000011 HC RX IP 250 OP 636: Performed by: NURSE ANESTHETIST, CERTIFIED REGISTERED

## 2023-06-07 PROCEDURE — 999N000141 HC STATISTIC PRE-PROCEDURE NURSING ASSESSMENT: Performed by: OBSTETRICS & GYNECOLOGY

## 2023-06-07 PROCEDURE — 59812 TREATMENT OF MISCARRIAGE: CPT | Performed by: NURSE ANESTHETIST, CERTIFIED REGISTERED

## 2023-06-07 PROCEDURE — 250N000013 HC RX MED GY IP 250 OP 250 PS 637: Performed by: OBSTETRICS & GYNECOLOGY

## 2023-06-07 PROCEDURE — 250N000009 HC RX 250: Performed by: OBSTETRICS & GYNECOLOGY

## 2023-06-07 PROCEDURE — 258N000003 HC RX IP 258 OP 636: Performed by: NURSE ANESTHETIST, CERTIFIED REGISTERED

## 2023-06-07 PROCEDURE — 250N000011 HC RX IP 250 OP 636: Performed by: OBSTETRICS & GYNECOLOGY

## 2023-06-07 PROCEDURE — 88305 TISSUE EXAM BY PATHOLOGIST: CPT | Mod: TC | Performed by: OBSTETRICS & GYNECOLOGY

## 2023-06-07 PROCEDURE — 370N000017 HC ANESTHESIA TECHNICAL FEE, PER MIN: Performed by: OBSTETRICS & GYNECOLOGY

## 2023-06-07 PROCEDURE — 59812 TREATMENT OF MISCARRIAGE: CPT | Performed by: OBSTETRICS & GYNECOLOGY

## 2023-06-07 PROCEDURE — 360N000076 HC SURGERY LEVEL 3, PER MIN: Performed by: OBSTETRICS & GYNECOLOGY

## 2023-06-07 PROCEDURE — 250N000009 HC RX 250: Performed by: NURSE ANESTHETIST, CERTIFIED REGISTERED

## 2023-06-07 PROCEDURE — 272N000001 HC OR GENERAL SUPPLY STERILE: Performed by: OBSTETRICS & GYNECOLOGY

## 2023-06-07 RX ORDER — LIDOCAINE 40 MG/G
CREAM TOPICAL
Status: DISCONTINUED | OUTPATIENT
Start: 2023-06-07 | End: 2023-06-07 | Stop reason: HOSPADM

## 2023-06-07 RX ORDER — ONDANSETRON 4 MG/1
4 TABLET, ORALLY DISINTEGRATING ORAL EVERY 30 MIN PRN
Status: DISCONTINUED | OUTPATIENT
Start: 2023-06-07 | End: 2023-06-07 | Stop reason: HOSPADM

## 2023-06-07 RX ORDER — HYDROCODONE BITARTRATE AND ACETAMINOPHEN 5; 325 MG/1; MG/1
1-2 TABLET ORAL EVERY 4 HOURS PRN
Qty: 10 TABLET | Refills: 0 | Status: SHIPPED | OUTPATIENT
Start: 2023-06-07 | End: 2023-10-09

## 2023-06-07 RX ORDER — PROPOFOL 10 MG/ML
INJECTION, EMULSION INTRAVENOUS PRN
Status: DISCONTINUED | OUTPATIENT
Start: 2023-06-07 | End: 2023-06-07

## 2023-06-07 RX ORDER — METRONIDAZOLE 500 MG/100ML
500 INJECTION, SOLUTION INTRAVENOUS ONCE
Status: COMPLETED | OUTPATIENT
Start: 2023-06-07 | End: 2023-06-07

## 2023-06-07 RX ORDER — SODIUM CHLORIDE, SODIUM LACTATE, POTASSIUM CHLORIDE, CALCIUM CHLORIDE 600; 310; 30; 20 MG/100ML; MG/100ML; MG/100ML; MG/100ML
INJECTION, SOLUTION INTRAVENOUS CONTINUOUS
Status: DISCONTINUED | OUTPATIENT
Start: 2023-06-07 | End: 2023-06-07 | Stop reason: HOSPADM

## 2023-06-07 RX ORDER — CLINDAMYCIN PHOSPHATE 900 MG/50ML
900 INJECTION, SOLUTION INTRAVENOUS
Status: DISCONTINUED | OUTPATIENT
Start: 2023-06-07 | End: 2023-06-07 | Stop reason: ALTCHOICE

## 2023-06-07 RX ORDER — IBUPROFEN 800 MG/1
800 TABLET, FILM COATED ORAL EVERY 8 HOURS PRN
Qty: 30 TABLET | Refills: 1 | Status: SHIPPED | OUTPATIENT
Start: 2023-06-07 | End: 2023-10-09

## 2023-06-07 RX ORDER — ONDANSETRON 2 MG/ML
4 INJECTION INTRAMUSCULAR; INTRAVENOUS EVERY 30 MIN PRN
Status: DISCONTINUED | OUTPATIENT
Start: 2023-06-07 | End: 2023-06-07 | Stop reason: HOSPADM

## 2023-06-07 RX ORDER — CEFAZOLIN SODIUM/WATER 2 G/20 ML
2 SYRINGE (ML) INTRAVENOUS
Status: COMPLETED | OUTPATIENT
Start: 2023-06-07 | End: 2023-06-07

## 2023-06-07 RX ORDER — ONDANSETRON 2 MG/ML
INJECTION INTRAMUSCULAR; INTRAVENOUS PRN
Status: DISCONTINUED | OUTPATIENT
Start: 2023-06-07 | End: 2023-06-07

## 2023-06-07 RX ORDER — LIDOCAINE HYDROCHLORIDE 20 MG/ML
INJECTION, SOLUTION INFILTRATION; PERINEURAL PRN
Status: DISCONTINUED | OUTPATIENT
Start: 2023-06-07 | End: 2023-06-07

## 2023-06-07 RX ORDER — OXYCODONE HYDROCHLORIDE 5 MG/1
10 TABLET ORAL
Status: DISCONTINUED | OUTPATIENT
Start: 2023-06-07 | End: 2023-06-07 | Stop reason: HOSPADM

## 2023-06-07 RX ORDER — PROPOFOL 10 MG/ML
INJECTION, EMULSION INTRAVENOUS CONTINUOUS PRN
Status: DISCONTINUED | OUTPATIENT
Start: 2023-06-07 | End: 2023-06-07

## 2023-06-07 RX ORDER — METHYLERGONOVINE MALEATE 0.2 MG/ML
INJECTION INTRAVENOUS PRN
Status: DISCONTINUED | OUTPATIENT
Start: 2023-06-07 | End: 2023-06-07

## 2023-06-07 RX ORDER — LIDOCAINE HYDROCHLORIDE 10 MG/ML
INJECTION, SOLUTION EPIDURAL; INFILTRATION; INTRACAUDAL; PERINEURAL PRN
Status: DISCONTINUED | OUTPATIENT
Start: 2023-06-07 | End: 2023-06-07 | Stop reason: HOSPADM

## 2023-06-07 RX ORDER — DEXAMETHASONE SODIUM PHOSPHATE 4 MG/ML
INJECTION, SOLUTION INTRA-ARTICULAR; INTRALESIONAL; INTRAMUSCULAR; INTRAVENOUS; SOFT TISSUE PRN
Status: DISCONTINUED | OUTPATIENT
Start: 2023-06-07 | End: 2023-06-07

## 2023-06-07 RX ORDER — OXYCODONE HYDROCHLORIDE 5 MG/1
5 TABLET ORAL
Status: DISCONTINUED | OUTPATIENT
Start: 2023-06-07 | End: 2023-06-07 | Stop reason: HOSPADM

## 2023-06-07 RX ORDER — ACETAMINOPHEN 325 MG/1
975 TABLET ORAL ONCE
Status: COMPLETED | OUTPATIENT
Start: 2023-06-07 | End: 2023-06-07

## 2023-06-07 RX ORDER — FENTANYL CITRATE 50 UG/ML
INJECTION, SOLUTION INTRAMUSCULAR; INTRAVENOUS PRN
Status: DISCONTINUED | OUTPATIENT
Start: 2023-06-07 | End: 2023-06-07

## 2023-06-07 RX ORDER — KETAMINE HYDROCHLORIDE 10 MG/ML
INJECTION INTRAMUSCULAR; INTRAVENOUS PRN
Status: DISCONTINUED | OUTPATIENT
Start: 2023-06-07 | End: 2023-06-07

## 2023-06-07 RX ORDER — ONDANSETRON 4 MG/1
4 TABLET, ORALLY DISINTEGRATING ORAL
Status: DISCONTINUED | OUTPATIENT
Start: 2023-06-07 | End: 2023-06-07 | Stop reason: HOSPADM

## 2023-06-07 RX ORDER — LIDOCAINE HYDROCHLORIDE 10 MG/ML
INJECTION, SOLUTION EPIDURAL; INFILTRATION; INTRACAUDAL; PERINEURAL
Status: DISCONTINUED
Start: 2023-06-07 | End: 2023-06-07 | Stop reason: HOSPADM

## 2023-06-07 RX ADMIN — ONDANSETRON 4 MG: 2 INJECTION INTRAMUSCULAR; INTRAVENOUS at 08:25

## 2023-06-07 RX ADMIN — FENTANYL CITRATE 50 MCG: 50 INJECTION, SOLUTION INTRAMUSCULAR; INTRAVENOUS at 08:35

## 2023-06-07 RX ADMIN — METRONIDAZOLE 500 MG: 500 INJECTION, SOLUTION INTRAVENOUS at 07:34

## 2023-06-07 RX ADMIN — METHYLERGONOVINE MALEATE 200 MCG: 0.2 INJECTION INTRAVENOUS at 08:38

## 2023-06-07 RX ADMIN — SODIUM CHLORIDE, POTASSIUM CHLORIDE, SODIUM LACTATE AND CALCIUM CHLORIDE: 600; 310; 30; 20 INJECTION, SOLUTION INTRAVENOUS at 07:21

## 2023-06-07 RX ADMIN — PROPOFOL 20 MG: 10 INJECTION, EMULSION INTRAVENOUS at 08:23

## 2023-06-07 RX ADMIN — DEXAMETHASONE SODIUM PHOSPHATE 10 MG: 4 INJECTION, SOLUTION INTRA-ARTICULAR; INTRALESIONAL; INTRAMUSCULAR; INTRAVENOUS; SOFT TISSUE at 08:25

## 2023-06-07 RX ADMIN — ACETAMINOPHEN 975 MG: 325 TABLET, FILM COATED ORAL at 07:27

## 2023-06-07 RX ADMIN — MIDAZOLAM 2 MG: 1 INJECTION INTRAMUSCULAR; INTRAVENOUS at 08:12

## 2023-06-07 RX ADMIN — Medication 20 MG: at 08:20

## 2023-06-07 RX ADMIN — Medication 20 MG: at 08:26

## 2023-06-07 RX ADMIN — FENTANYL CITRATE 50 MCG: 50 INJECTION, SOLUTION INTRAMUSCULAR; INTRAVENOUS at 08:21

## 2023-06-07 RX ADMIN — Medication 10 MG: at 08:23

## 2023-06-07 RX ADMIN — Medication 2 G: at 08:12

## 2023-06-07 RX ADMIN — PROPOFOL 50 MG: 10 INJECTION, EMULSION INTRAVENOUS at 08:20

## 2023-06-07 RX ADMIN — TRANEXAMIC ACID 1 G: 1 INJECTION, SOLUTION INTRAVENOUS at 08:33

## 2023-06-07 RX ADMIN — PROPOFOL 30 MG: 10 INJECTION, EMULSION INTRAVENOUS at 08:26

## 2023-06-07 RX ADMIN — FENTANYL CITRATE 50 MCG: 50 INJECTION, SOLUTION INTRAMUSCULAR; INTRAVENOUS at 08:23

## 2023-06-07 RX ADMIN — LIDOCAINE HYDROCHLORIDE 40 MG: 20 INJECTION, SOLUTION INFILTRATION; PERINEURAL at 08:20

## 2023-06-07 RX ADMIN — PROPOFOL 75 MCG/KG/MIN: 10 INJECTION, EMULSION INTRAVENOUS at 08:30

## 2023-06-07 ASSESSMENT — ACTIVITIES OF DAILY LIVING (ADL)
ADLS_ACUITY_SCORE: 35
ADLS_ACUITY_SCORE: 35

## 2023-06-07 NOTE — ANESTHESIA POSTPROCEDURE EVALUATION
Patient: Karen Garcia    Procedure: Procedure(s):  DILATION AND CURETTAGE, UTERUS, USING SUCTION       Anesthesia Type:  MAC    Note:  Disposition: Outpatient   Postop Pain Control: Uneventful            Sign Out: Well controlled pain   PONV: No   Neuro/Psych: Uneventful            Sign Out: Acceptable/Baseline neuro status   Airway/Respiratory: Uneventful            Sign Out: Acceptable/Baseline resp. status   CV/Hemodynamics: Uneventful            Sign Out: Acceptable CV status; No obvious hypovolemia; No obvious fluid overload   Other NRE: NONE   DID A NON-ROUTINE EVENT OCCUR? No           Last vitals:  Vitals Value Taken Time   /58 06/07/23 0945   Temp 97.3  F (36.3  C) 06/07/23 0945   Pulse 71 06/07/23 0945   Resp 16 06/07/23 0945   SpO2 96 % 06/07/23 0946   Vitals shown include unvalidated device data.    Electronically Signed By: VITOR Nelson CRNA  June 7, 2023  11:47 AM

## 2023-06-07 NOTE — OP NOTE
[facility]      Pre-operative diagnosis: Incomplete miscarriage 6 weeks gestation   Post-operative diagnosis: Same   Procedure: Suction Dilatation and Currettage   Surgeon: Jeevan Oconnell MD   Assistant(s): None   Anesthesia: MAC (monitored anesthesia care) and Paracervical block:  1% plain Lidocaine   Estimated blood loss: less than 50ml   Total IV fluids: (See anesthesia record)   Blood transfusion: No transfusion was given during surgery   Total urine output: (See anesthesia record)   Drains: None   Specimens: Products on conception     OPERATIVE FINDINGS:  Retained products of conception.  Path pending  OPERATIVE DICTATION: The patient was brought to the operating room and uneventfully placed under monitored IV anesthesia. She was prepped and draped in the dorsal lithotomy position and her bladder drained. The cervix was visualized with a weighted speculum and grasped anteriorly with a fine-toothed tenaculum.  A paracervical and cervical block was performed lidocaine.  The cervix was gently dilated with Hegar dilators and admitted a number 11 curved suction curette. Suction was applied and the uterine contents evacuated over 3 consecutive passes.   Gentle but thorough sharp curettage was performed in all 4 quadrants to ensure completion.  At this point there was excellent hemostasis. The instruments were removed. There were no complications and the patient was transferred to the recovery room in excellent stable condition.       Jeevan Oconnell MD

## 2023-06-07 NOTE — DISCHARGE INSTRUCTIONS
Call MD prior to DC.  No driving today or while on pain medications  Pelvic rest for 2 weeks  Call Dr. Oconnell [phone] as necessary if problems  Schedule PO check (telephone) 2 weeks Dr. Oconnell    Discharge Instructions for Dilation and Curettage (D and C)   You had a dilation and curettage (D&C). The reasons for having this procedure vary. It may be done to control heavy uterine bleeding or to find the cause of irregular bleeding. It may also be done to remove pregnancy tissue after an  or miscarriage.   Home care  Take it easy. Rest for 2 days as needed.  Go back to your normal activities after 24 to 48 hours. You may also return to work at that time.  Eat a normal diet.  Take an over-the-counter pain reliever if needed.  It s OK to have bleeding for about a week after the D&C. The amount of bleeding should be similar to what you have during a normal period.  Don t drive for 24 hours after the D&C unless your provider says it's OK.  Don t have sex or use tampons or douches until your healthcare provider says it s safe.    Follow-up  Make a follow-up appointment, or as advised.    When to call your healthcare provider  Call your healthcare provider right away if you have any of these:   Bleeding that soaks more than 1 sanitary pad in 1 hour  Severe belly pain  Severe cramps  Fever of 100.4 F ( 38.0 C) or higher, or as directed by your provider  A bad-smelling vaginal discharge  [employer] last reviewed this educational content on 2020-2022 [employer]. All rights reserved. This information is not intended as a substitute for professional medical care. Always follow your healthcare professional's instructions.

## 2023-06-07 NOTE — ANESTHESIA PREPROCEDURE EVALUATION
Anesthesia Pre-Procedure Evaluation    Patient: Karen Garcia   MRN: [id number] : 1985        Procedure : Procedure(s):  DILATION AND CURETTAGE, UTERUS, USING SUCTION          History reviewed. No pertinent past medical history.   Past Surgical History:   Procedure Laterality Date     EXCISE LESION HEAD N/A 5/10/2021    Procedure: Excision of Scalp Lesion;  Surgeon: Erik Bahena MD;  Location: [facility] OR      No Known Allergies   Social History     Tobacco Use     Smoking status: Never     Smokeless tobacco: Never   Vaping Use     Vaping status: Never Used   Substance Use Topics     Alcohol use: Yes     Comment: occasionally      Wt Readings from Last 1 Encounters:   23 94.8 kg (209 lb)        Anesthesia Evaluation   Pt has had prior anesthetic.     No history of anesthetic complications       ROS/MED HX  ENT/Pulmonary:  - neg pulmonary ROS     Neurologic:       Cardiovascular:  - neg cardiovascular ROS     METS/Exercise Tolerance: >4 METS    Hematologic:  - neg hematologic  ROS     Musculoskeletal:  - neg musculoskeletal ROS     GI/Hepatic:  - neg GI/hepatic ROS     Renal/Genitourinary:  - neg Renal ROS     Endo:  - neg endo ROS     Psychiatric/Substance Use:  - neg psychiatric ROS     Infectious Disease:  - neg infectious disease ROS     Malignancy:  - neg malignancy ROS     Other:  - neg other ROS          Physical Exam    Airway        Mallampati: III   TM distance: > 3 FB   Neck ROM: full   Mouth opening: > 3 cm    Respiratory Devices and Support         Dental       (+) Completely normal teeth      Cardiovascular   cardiovascular exam normal       Rhythm and rate: regular and normal     Pulmonary   pulmonary exam normal                OUTSIDE LABS:  CBC:   Lab Results   Component Value Date    WBC 11.2 (H) 2023    WBC 10.8 2023    HGB 13.7 2023    HGB 14.3 2023    HCT 39.4 2023    HCT 40.7 2023     2023     2023     BMP:   Lab  Results   Component Value Date     11/18/2021     03/25/2021    POTASSIUM 3.8 11/18/2021    POTASSIUM 3.6 03/25/2021    CHLORIDE 107 11/18/2021    CHLORIDE 102 03/25/2021    CO2 25 11/18/2021    CO2 27 03/25/2021    BUN 7 11/18/2021    BUN 12 03/25/2021    CR 0.93 11/18/2021    CR 0.89 03/25/2021    GLC 83 11/18/2021    GLC 88 03/25/2021     COAGS: No results found for: PTT, INR, FIBR  POC:   Lab Results   Component Value Date    HCG Negative 05/10/2021     HEPATIC:   Lab Results   Component Value Date    ALBUMIN 4.3 03/25/2021    PROTTOTAL 7.6 03/25/2021    ALT 22 03/25/2021    AST 9 03/25/2021    ALKPHOS 51 03/25/2021    BILITOTAL 0.9 03/25/2021     OTHER:   Lab Results   Component Value Date    DANIEL 8.8 11/18/2021    TSH 1.28 11/18/2021       Anesthesia Plan    ASA Status:  1   NPO Status:  NPO Appropriate    Anesthesia Type: MAC.     - Reason for MAC: straight local not clinically adequate   Induction: Intravenous, Propofol.   Maintenance: Balanced.        Consents    Anesthesia Plan(s) and associated risks, benefits, and realistic alternatives discussed. Questions answered and patient/representative(s) expressed understanding.     - Discussed: Risks, Benefits and Alternatives for BOTH SEDATION and the PROCEDURE were discussed     - Discussed with:  Patient      - Extended Intubation/Ventilatory Support Discussed: No.      - Patient is DNR/DNI Status: No    Use of blood products discussed: No .     Postoperative Care    Pain management: IV analgesics, Multi-modal analgesia.   PONV prophylaxis: Ondansetron (or other 5HT-3), Dexamethasone or Solumedrol     Comments:           H&P reviewed: Unable to attach H&P to encounter due to EHR limitations. H&P Update: appropriate H&P reviewed, patient examined. No interval changes since H&P (within 30 days).         VITOR Nelson CRNA

## 2023-06-07 NOTE — OR NURSING
Patient used restroom prior to discharge.  Escorted to vehicle via wheelchair with family present.  Home to rest.    Patient and responsible adult given discharge instructions with no questions regarding instructions. Crystal score 19. Pain level 0/10.  Discharged from unit via wheelchair. Patient discharged to home with family.

## 2023-06-07 NOTE — OR NURSING
Patient is awake/alert and interactive.  Skin is pink, warm, dry and intact.  Patient is having scant/small amounts of sanguinous/serosanguious discharge from her vagina.  New pad given to patient along with mesh panties.  Patient denies any abdominal/vaginal discomfort.  Patient was able to eat and drink without any difficulties.  VSS.  Discharge instructions reviewed with patient and  both; both verbalized understanding and has no further questions and/or concerns.  Patient was able to dress self without any difficulties.

## 2023-06-07 NOTE — ANESTHESIA CARE TRANSFER NOTE
Patient: Karen Garcia    Procedure: Procedure(s):  DILATION AND CURETTAGE, UTERUS, USING SUCTION       Diagnosis: Incomplete spontaneous  [O03.4]  Diagnosis Additional Information: No value filed.    Anesthesia Type:   MAC     Note:    Oropharynx: oropharynx clear of all foreign objects and spontaneously breathing  Level of Consciousness: drowsy  Oxygen Supplementation: nasal cannula  Level of Supplemental Oxygen (L/min / FiO2): 2  Independent Airway: airway patency satisfactory and stable  Dentition: dentition unchanged  Vital Signs Stable: post-procedure vital signs reviewed and stable  Report to RN Given: handoff report given  Patient transferred to: Phase II    Handoff Report: Identifed the Patient, Identified the Reponsible Provider, Reviewed the pertinent medical history, Discussed the surgical course, Reviewed Intra-OP anesthesia mangement and issues during anesthesia, Set expectations for post-procedure period and Allowed opportunity for questions and acknowledgement of understanding      Vitals:  Vitals Value Taken Time   BP     Temp     Pulse     Resp     SpO2         Electronically Signed By: VITOR Guardado CRNA  2023  8:53 AM

## 2023-06-08 LAB
PATH REPORT.COMMENTS IMP SPEC: NORMAL
PATH REPORT.FINAL DX SPEC: NORMAL
PATH REPORT.GROSS SPEC: NORMAL
PATH REPORT.MICROSCOPIC SPEC OTHER STN: NORMAL
PATH REPORT.RELEVANT HX SPEC: NORMAL
PHOTO IMAGE: NORMAL

## 2023-06-12 LAB
PATH REPORT.COMMENTS IMP SPEC: NORMAL
PATH REPORT.GROSS SPEC: NORMAL
PATH REPORT.MICROSCOPIC SPEC OTHER STN: NORMAL
PATH REPORT.RELEVANT HX SPEC: NORMAL
PHOTO IMAGE: NORMAL

## 2023-10-09 ENCOUNTER — OFFICE VISIT (OUTPATIENT)
Dept: FAMILY MEDICINE | Facility: OTHER | Age: 38
End: 2023-10-09
Attending: NURSE PRACTITIONER
Payer: COMMERCIAL

## 2023-10-09 VITALS
HEIGHT: 72 IN | BODY MASS INDEX: 29.68 KG/M2 | HEART RATE: 62 BPM | WEIGHT: 219.1 LBS | SYSTOLIC BLOOD PRESSURE: 110 MMHG | OXYGEN SATURATION: 99 % | TEMPERATURE: 97.8 F | RESPIRATION RATE: 16 BRPM | DIASTOLIC BLOOD PRESSURE: 68 MMHG

## 2023-10-09 DIAGNOSIS — Z12.31 ENCOUNTER FOR SCREENING MAMMOGRAM FOR BREAST CANCER: ICD-10-CM

## 2023-10-09 DIAGNOSIS — L30.9 DERMATITIS: ICD-10-CM

## 2023-10-09 DIAGNOSIS — Z00.00 ANNUAL PHYSICAL EXAM: Primary | ICD-10-CM

## 2023-10-09 LAB
ANION GAP SERPL CALCULATED.3IONS-SCNC: 12 MMOL/L (ref 7–15)
BASO+EOS+MONOS # BLD AUTO: NORMAL 10*3/UL
BASO+EOS+MONOS NFR BLD AUTO: NORMAL %
BASOPHILS # BLD AUTO: 0.1 10E3/UL (ref 0–0.2)
BASOPHILS NFR BLD AUTO: 1 %
BUN SERPL-MCNC: 13 MG/DL (ref 6–20)
CALCIUM SERPL-MCNC: 9.6 MG/DL (ref 8.6–10)
CHLORIDE SERPL-SCNC: 102 MMOL/L (ref 98–107)
CHOLEST SERPL-MCNC: 253 MG/DL
CREAT SERPL-MCNC: 0.9 MG/DL (ref 0.51–0.95)
DEPRECATED HCO3 PLAS-SCNC: 24 MMOL/L (ref 22–29)
EGFRCR SERPLBLD CKD-EPI 2021: 83 ML/MIN/1.73M2
EOSINOPHIL # BLD AUTO: 0.5 10E3/UL (ref 0–0.7)
EOSINOPHIL NFR BLD AUTO: 5 %
ERYTHROCYTE [DISTWIDTH] IN BLOOD BY AUTOMATED COUNT: 12.1 % (ref 10–15)
GLUCOSE SERPL-MCNC: 85 MG/DL (ref 70–99)
HCT VFR BLD AUTO: 41.6 % (ref 35–47)
HDLC SERPL-MCNC: 60 MG/DL
HGB BLD-MCNC: 14.3 G/DL (ref 11.7–15.7)
IMM GRANULOCYTES # BLD: 0 10E3/UL
IMM GRANULOCYTES NFR BLD: 0 %
LDLC SERPL CALC-MCNC: 167 MG/DL
LYMPHOCYTES # BLD AUTO: 2.5 10E3/UL (ref 0.8–5.3)
LYMPHOCYTES NFR BLD AUTO: 29 %
MCH RBC QN AUTO: 31 PG (ref 26.5–33)
MCHC RBC AUTO-ENTMCNC: 34.4 G/DL (ref 31.5–36.5)
MCV RBC AUTO: 90 FL (ref 78–100)
MONOCYTES # BLD AUTO: 0.6 10E3/UL (ref 0–1.3)
MONOCYTES NFR BLD AUTO: 8 %
NEUTROPHILS # BLD AUTO: 4.9 10E3/UL (ref 1.6–8.3)
NEUTROPHILS NFR BLD AUTO: 57 %
NONHDLC SERPL-MCNC: 193 MG/DL
PLATELET # BLD AUTO: 312 10E3/UL (ref 150–450)
POTASSIUM SERPL-SCNC: 3.9 MMOL/L (ref 3.4–5.3)
RBC # BLD AUTO: 4.61 10E6/UL (ref 3.8–5.2)
SODIUM SERPL-SCNC: 138 MMOL/L (ref 135–145)
TRIGL SERPL-MCNC: 130 MG/DL
TSH SERPL DL<=0.005 MIU/L-ACNC: 2.95 UIU/ML (ref 0.3–4.2)
WBC # BLD AUTO: 8.5 10E3/UL (ref 4–11)

## 2023-10-09 PROCEDURE — 36415 COLL VENOUS BLD VENIPUNCTURE: CPT | Performed by: NURSE PRACTITIONER

## 2023-10-09 PROCEDURE — 85025 COMPLETE CBC W/AUTO DIFF WBC: CPT | Performed by: NURSE PRACTITIONER

## 2023-10-09 PROCEDURE — 99395 PREV VISIT EST AGE 18-39: CPT | Performed by: NURSE PRACTITIONER

## 2023-10-09 PROCEDURE — 80048 BASIC METABOLIC PNL TOTAL CA: CPT | Performed by: NURSE PRACTITIONER

## 2023-10-09 PROCEDURE — 84443 ASSAY THYROID STIM HORMONE: CPT | Performed by: NURSE PRACTITIONER

## 2023-10-09 PROCEDURE — 80061 LIPID PANEL: CPT | Performed by: NURSE PRACTITIONER

## 2023-10-09 RX ORDER — TRIAMCINOLONE ACETONIDE 1 MG/G
CREAM TOPICAL 2 TIMES DAILY
Qty: 80 G | Refills: 1 | Status: SHIPPED | OUTPATIENT
Start: 2023-10-09

## 2023-10-09 RX ORDER — PREDNISONE 20 MG/1
40 TABLET ORAL DAILY
Qty: 10 TABLET | Refills: 0 | Status: SHIPPED | OUTPATIENT
Start: 2023-10-09 | End: 2023-10-14

## 2023-10-09 ASSESSMENT — ENCOUNTER SYMPTOMS
SHORTNESS OF BREATH: 0
PARESTHESIAS: 0
HEARTBURN: 0
HEMATURIA: 0
JOINT SWELLING: 0
SORE THROAT: 0
EYE PAIN: 0
DIZZINESS: 0
WEAKNESS: 0
CONSTIPATION: 0
ARTHRALGIAS: 0
MYALGIAS: 0
BREAST MASS: 0
ABDOMINAL PAIN: 0
FEVER: 0
DYSURIA: 0
FREQUENCY: 0
PALPITATIONS: 0
DIARRHEA: 0
COUGH: 0
HEMATOCHEZIA: 0
CHILLS: 0
HEADACHES: 0
NERVOUS/ANXIOUS: 0
NAUSEA: 0

## 2023-10-09 ASSESSMENT — PAIN SCALES - GENERAL: PAINLEVEL: NO PAIN (0)

## 2023-10-09 NOTE — PROGRESS NOTES
SUBJECTIVE:   CC: Beryl is an 38 year old who presents for preventive health visit.       10/9/2023     2:51 PM   Additional Questions   Roomed by franchesca   Accompanied by none       Healthy Habits:     Getting at least 3 servings of Calcium per day:  Yes    Bi-annual eye exam:  NO    Dental care twice a year:  NO    Sleep apnea or symptoms of sleep apnea:  None    Diet:  Regular (no restrictions)    Frequency of exercise:  4-5 days/week    Duration of exercise:  30-45 minutes    Taking medications regularly:  Yes    Medication side effects:  Not applicable    Additional concerns today:  No        Skin rash:  red, scaly and itchy, on upper back, hands, left elbow.   Has not tried anything to reduce symptoms       Social History     Tobacco Use    Smoking status: Never    Smokeless tobacco: Never   Substance Use Topics    Alcohol use: Yes     Comment: occasionally             10/9/2023     7:13 AM   Alcohol Use   Prescreen: >3 drinks/day or >7 drinks/week? No          No data to display              Reviewed orders with patient.  Reviewed health maintenance and updated orders accordingly -     Breast Cancer Screenin/16/2021     5:58 PM   Breast CA Risk Assessment (FHS-7)   Do you have a family history of breast, colon, or ovarian cancer? No / Unknown           Pertinent mammograms are reviewed under the imaging tab.    History of abnormal Pap smear:       Latest Ref Rng & Units 2021     8:56 AM   PAP / HPV   PAP  Negative for Intraepithelial Lesion or Malignancy (NILM)    HPV 16 DNA Negative Negative    HPV 18 DNA Negative Negative    Other HR HPV Negative Negative      Reviewed and updated as needed this visit by clinical staff   Tobacco  Allergies               Reviewed and updated as needed this visit by Provider    BP Readings from Last 3 Encounters:   10/09/23 110/68   23 104/58   23 125/80    Wt Readings from Last 3 Encounters:   10/09/23 99.4 kg (219 lb 1.6 oz)   23 94 kg  "(207 lb 3.2 oz)   05/18/23 94.8 kg (209 lb)                                Review of Systems   Constitutional:  Negative for chills and fever.   HENT:  Negative for congestion, ear pain, hearing loss and sore throat.    Eyes:  Negative for pain and visual disturbance.   Respiratory:  Negative for cough and shortness of breath.    Cardiovascular:  Negative for chest pain, palpitations and peripheral edema.   Gastrointestinal:  Negative for abdominal pain, constipation, diarrhea, heartburn, hematochezia and nausea.   Breasts:  Negative for tenderness, breast mass and discharge.   Genitourinary:  Negative for dysuria, frequency, genital sores, hematuria, pelvic pain, urgency, vaginal bleeding and vaginal discharge.   Musculoskeletal:  Negative for arthralgias, joint swelling and myalgias.   Skin:  Positive for rash.   Neurological:  Negative for dizziness, weakness, headaches and paresthesias.   Psychiatric/Behavioral:  Negative for mood changes. The patient is not nervous/anxious.           OBJECTIVE:   /68 (BP Location: Right arm, Patient Position: Chair, Cuff Size: Adult Large)   Pulse 62   Temp 97.8  F (36.6  C) (Tympanic)   Resp 16   Ht 1.854 m (6' 1\")   Wt 99.4 kg (219 lb 1.6 oz)   LMP 03/16/2023   SpO2 99%   Breastfeeding Unknown   BMI 28.91 kg/m    Physical Exam  GENERAL: healthy, alert and no distress  EYES: Eyes grossly normal to inspection, PERRL and conjunctivae and sclerae normal  HENT: ear canals and TM's normal, nose and mouth without ulcers or lesions  NECK: no adenopathy, no asymmetry, masses, or scars and thyroid normal to palpation  RESP: lungs clear to auscultation - no rales, rhonchi or wheezes  CV: regular rate and rhythm, normal S1 S2, no S3 or S4, no murmur, click or rub, no peripheral edema and peripheral pulses strong  MS: no gross musculoskeletal defects noted, no edema  SKIN: scaly erythematous patches no drainage noted.   PSYCH: mentation appears normal, affect " "normal/bright        ASSESSMENT/PLAN:   1. Annual physical exam  Exam completed.   - Lipid Profile; Future  - TSH with free T4 reflex; Future  - Basic metabolic panel; Future  - CBC with Platelets & Differential; Future    2. Dermatitis  - triamcinolone (KENALOG) 0.1 % external cream; Apply topically 2 times daily  Dispense: 80 g; Refill: 1  - predniSONE (DELTASONE) 20 MG tablet; Take 2 tablets (40 mg) by mouth daily for 5 days  Dispense: 10 tablet; Refill: 0    3. Encounter for screening mammogram for breast cancer  - MA Screen Bilateral w/[place]; Future      Patient has been advised of split billing requirements and indicates understanding: Yes      COUNSELING:  Reviewed preventive health counseling, as reflected in patient instructions       Regular exercise       Healthy diet/nutrition       Vision screening       Hearing screening       Immunizations          BMI:   Estimated body mass index is 28.91 kg/m  as calculated from the following:    Height as of this encounter: 1.854 m (6' 1\").    Weight as of this encounter: 99.4 kg (219 lb 1.6 oz).   Weight management plan: Discussed healthy diet and exercise guidelines      She reports that she has never smoked. She has never used smokeless tobacco.          Kelsey Parra, NP  [facility]  "

## 2023-11-16 ENCOUNTER — TRANSFERRED RECORDS (OUTPATIENT)
Dept: HEALTH INFORMATION MANAGEMENT | Facility: CLINIC | Age: 38
End: 2023-11-16

## 2024-08-08 ENCOUNTER — OFFICE VISIT (OUTPATIENT)
Dept: OBGYN | Facility: OTHER | Age: 39
End: 2024-08-08
Attending: OBSTETRICS & GYNECOLOGY
Payer: COMMERCIAL

## 2024-08-08 VITALS
BODY MASS INDEX: 28.27 KG/M2 | OXYGEN SATURATION: 99 % | WEIGHT: 208.7 LBS | RESPIRATION RATE: 18 BRPM | SYSTOLIC BLOOD PRESSURE: 128 MMHG | HEART RATE: 73 BPM | HEIGHT: 72 IN | DIASTOLIC BLOOD PRESSURE: 82 MMHG

## 2024-08-08 DIAGNOSIS — N97.9 FEMALE INFERTILITY: Primary | ICD-10-CM

## 2024-08-08 DIAGNOSIS — N83.8 DIMINISHED OVARIAN RESERVE DUE TO ADVANCED MATERNAL AGE: ICD-10-CM

## 2024-08-08 PROCEDURE — 99214 OFFICE O/P EST MOD 30 MIN: CPT | Performed by: OBSTETRICS & GYNECOLOGY

## 2024-08-08 ASSESSMENT — PAIN SCALES - GENERAL: PAINLEVEL: NO PAIN (0)

## 2024-08-08 NOTE — PROGRESS NOTES
CC: infertility consult   HPI: Karen Garcia is a 39 year old female No LMP recorded. Menses are regular q 28-30 days, lasting 4 days.  The patient has been trying to conceive for 1 year since SAB in 2023.    Past GYN history:  No STD history       Last PAP smear:  Normal    History of abnormal PAP: No  History of surgery to cervix: No   Dysmenorrhea none.   Galactorrhea: No    Hirsuitism: No  Significant change in weight within last year: Yes, mild wt gain           Prior pregnancy history is as follows: early SAB with D and C for incomplete AB.     PAST INFERTILITY EVALUATION AND TREATMENT:  None    Past infertility treatment included None.  Home ODK have not been +.    Partner is 33 years old. No history of trauma to the genitalia, medications, tobacco.  Occasional THC. No prior SA. No prior children.    Allergies: Patient has no known allergies.                    No past medical history on file.    Past Surgical History:   Procedure Laterality Date    DILATION AND CURETTAGE SUCTION N/A 6/7/2023    Procedure: DILATION AND CURETTAGE, UTERUS, USING SUCTION;  Surgeon: Jeevan Oconnell MD;  Location: [facility]    EXCISE LESION HEAD N/A 5/10/2021    Procedure: Excision of Scalp Lesion;  Surgeon: Erik Bahena MD;  Location: [facility]           Social History     Tobacco Use    Smoking status: Never    Smokeless tobacco: Never   Substance Use Topics    Alcohol use: Yes     Comment: occasionally              Family History   Problem Relation Age of Onset    Throat cancer Father     Melanoma Maternal Grandfather     Coronary Artery Disease Paternal Grandfather     Heart Disease Paternal Grandfather     Brain Cancer Brother        Current Outpatient Medications   Medication Sig Dispense Refill    Prenatal Vit-Fe Fumarate-FA (PRENATAL MULTIVITAMIN W/IRON) 27-0.8 MG tablet Take 1 tablet by mouth daily      triamcinolone (KENALOG) 0.1 % external cream Apply topically 2 times daily 80 g 1           EXAM:  Blood pressure  "128/82, pulse 73, resp. rate 18, height 1.854 m (6' 1\"), weight 94.7 kg (208 lb 11.2 oz), SpO2 99%, unknown if currently breastfeeding.   BMI= Body mass index is 27.53 kg/m .  General - pleasant female in no acute distress  Normal mental status  Abdomen - soft, nontender, nondistended, no hepatosplenomegaly.  Pelvic/rectal - deferred.  Extrem:  neg          ASSESSMENT/ PLAN:  Infertility with advanced reproductive age  Plan fertility w/p.  Pt lives in [place].  She will call when her period starts to schedule labs (which we will try to order and have done in [place]) and HSG in [place].  Day 3 FSH, Prolactin, Estradiol, AMH  Day 7-14 HSG  Day 21 progesterone.  Partner Escobar  ordered.  F/up appt in 1 month to discuss results and POC.  Heathy lifestyle measures discussed.  AMA discussed and that fertility rapidly drops after age of 40, increased pregnancy and aneuploidy risks.    Home ODK, timed intercourse, PNV.   Pt has my card and phone number to call as needed if problems in the interim or she does not hear her results.   35  minutes were spent on the day of the encounter, outside of a procedure,  doing face-to-face counseling, review or records, charting,  and coordination of care           Jeevan Oconnell MD   "

## 2024-08-09 DIAGNOSIS — N97.9 FEMALE INFERTILITY: Primary | ICD-10-CM

## 2024-08-11 ENCOUNTER — TRANSFERRED RECORDS (OUTPATIENT)
Dept: HEALTH INFORMATION MANAGEMENT | Facility: CLINIC | Age: 39
End: 2024-08-11

## 2024-08-15 ENCOUNTER — HOSPITAL ENCOUNTER (OUTPATIENT)
Dept: GENERAL RADIOLOGY | Facility: HOSPITAL | Age: 39
Discharge: HOME | End: 2024-08-15
Attending: OBSTETRICS & GYNECOLOGY
Payer: COMMERCIAL

## 2024-08-15 ENCOUNTER — LAB (OUTPATIENT)
Dept: LAB | Facility: OTHER | Age: 39
End: 2024-08-15
Attending: OBSTETRICS & GYNECOLOGY
Payer: COMMERCIAL

## 2024-08-15 DIAGNOSIS — N97.9 FEMALE INFERTILITY: ICD-10-CM

## 2024-08-15 LAB — HCG UR QL: NEGATIVE

## 2024-08-15 PROCEDURE — 255N000002 HC RX 255 OP 636: Performed by: RADIOLOGY

## 2024-08-15 PROCEDURE — 81025 URINE PREGNANCY TEST: CPT

## 2024-08-15 PROCEDURE — 58340 CATHETER FOR HYSTEROGRAPHY: CPT | Performed by: OBSTETRICS & GYNECOLOGY

## 2024-08-15 PROCEDURE — 58340 CATHETER FOR HYSTEROGRAPHY: CPT

## 2024-08-15 RX ORDER — IOPAMIDOL 612 MG/ML
30 INJECTION, SOLUTION INTRAVASCULAR ONCE
Status: COMPLETED | OUTPATIENT
Start: 2024-08-15 | End: 2024-08-15

## 2024-08-15 RX ADMIN — IOPAMIDOL 10 ML: 612 INJECTION, SOLUTION INTRAVENOUS at 13:47

## 2024-08-15 NOTE — PROCEDURES
Hospital Procedure Note:      Procedure Hysterosalpingogram    Indication:  Infertility.      Informed consent for procedure obtained.  She was positioned in the dorsal lithotomy position on the exam table.  The cervix was visualized with a speculum and prepped with betadine.  The cervix was grasped with a fine tooth tenaculum and the HSG catheter introduced.  12cc of contrast dye was injected under fluoroscopic guidance.   Images were obtained.  See radiologist interpretation.  The instruments were removed.  There were no complications and the patient tolerated the procedure well.

## 2024-08-29 ENCOUNTER — TRANSFERRED RECORDS (OUTPATIENT)
Dept: HEALTH INFORMATION MANAGEMENT | Facility: CLINIC | Age: 39
End: 2024-08-29

## 2024-09-19 ENCOUNTER — OFFICE VISIT (OUTPATIENT)
Dept: OBGYN | Facility: OTHER | Age: 39
End: 2024-09-19
Attending: OBSTETRICS & GYNECOLOGY
Payer: COMMERCIAL

## 2024-09-19 VITALS
OXYGEN SATURATION: 98 % | BODY MASS INDEX: 28.88 KG/M2 | DIASTOLIC BLOOD PRESSURE: 70 MMHG | HEIGHT: 72 IN | SYSTOLIC BLOOD PRESSURE: 114 MMHG | HEART RATE: 65 BPM | WEIGHT: 213.2 LBS

## 2024-09-19 DIAGNOSIS — N97.9 FEMALE INFERTILITY: Primary | ICD-10-CM

## 2024-09-19 PROCEDURE — 99214 OFFICE O/P EST MOD 30 MIN: CPT | Performed by: OBSTETRICS & GYNECOLOGY

## 2024-09-19 RX ORDER — CLOMIPHENE CITRATE 50 MG/1
50 TABLET ORAL DAILY
Qty: 5 TABLET | Refills: 2 | Status: SHIPPED | OUTPATIENT
Start: 2024-09-19

## 2024-09-19 ASSESSMENT — PAIN SCALES - GENERAL: PAINLEVEL: NO PAIN (0)

## 2024-09-19 NOTE — PROGRESS NOTES
"S: f/up  infertility.  See my prior eval.    Pt returns to discuss testing results and POC.  No new complaints.     Results:  Essentially nml SA.  Nml HSG. FSH 9.  Nml AMH, TSH, Prolactin, day 21 progesterone.  She did pcik up some home ODK which have been +, doing timed intercourse.  On PNV.            Patient Active Problem List   Diagnosis    Scalp lesion    Incomplete spontaneous           No past medical history on file.         Past Surgical History:   Procedure Laterality Date    DILATION AND CURETTAGE SUCTION N/A 2023    Procedure: DILATION AND CURETTAGE, UTERUS, USING SUCTION;  Surgeon: Jeevan Oconnell MD;  [facility]    EXCISE LESION HEAD N/A 5/10/2021    Procedure: Excision of Scalp Lesion;  Surgeon: Erik Bahena MD;  [facility]: [facility]            Social History     Tobacco Use    Smoking status: Never    Smokeless tobacco: Never   Substance Use Topics    Alcohol use: Yes     Comment: occasionally            Family History   Problem Relation Age of Onset    Throat cancer Father     Melanoma Maternal Grandfather     Coronary Artery Disease Paternal Grandfather     Heart Disease Paternal Grandfather     Brain Cancer Brother              No Known Allergies         Current Outpatient Medications   Medication Sig Dispense Refill    clomiPHENE (CLOMID) 50 MG tablet Take 1 tablet (50 mg) by mouth daily. Day 3-7 of menstrual cycle. 5 tablet 2    Prenatal Vit-Fe Fumarate-FA (PRENATAL MULTIVITAMIN W/IRON) 27-0.8 MG tablet Take 1 tablet by mouth daily      triamcinolone (KENALOG) 0.1 % external cream Apply topically 2 times daily 80 g 1          Review Of Systems  Constitutional:  Denies fever  GI/ negative except as noted per hpi    O:   /70 (BP Location: Left arm, Patient Position: Sitting, Cuff Size: Adult Regular)   Pulse 65   Ht 1.854 m (6' 1\")   Wt 96.7 kg (213 lb 3.2 oz)   SpO2 98%   BMI 28.13 kg/m    Gen:  NAD, A and O       A:  Infertility, advanced reproductive age with " nml fertility testing.      P:  Expectant, medical, referral options discussed.   Declines referral at this time for fertility specialist for ART.  Pt would like to try Clomid ovulation induction to improve follicle recruitment and if no pregnancy within 4 months consider referral.  Discussed heathy life style measures, home ODK, timed intercourse, PNV. Her  is gone next month so she will start in November and call to schedule day 21 progesterone to confirm ovulation on clomid dosage.  Clomid R/B/SE's discussed.  Pt has my card and phone number to call as needed if problems in the interim or she does not hear her results.

## 2024-11-20 LAB
ABO/RH(D): NORMAL
ANTIBODY SCREEN: NEGATIVE
SPECIMEN EXPIRATION DATE: NORMAL

## 2024-11-21 ENCOUNTER — PRENATAL OFFICE VISIT (OUTPATIENT)
Dept: OBGYN | Facility: OTHER | Age: 39
End: 2024-11-21
Attending: OBSTETRICS & GYNECOLOGY
Payer: COMMERCIAL

## 2024-11-21 ENCOUNTER — TELEPHONE (OUTPATIENT)
Dept: OBGYN | Facility: OTHER | Age: 39
End: 2024-11-21

## 2024-11-21 VITALS
HEART RATE: 78 BPM | SYSTOLIC BLOOD PRESSURE: 134 MMHG | OXYGEN SATURATION: 98 % | WEIGHT: 217.4 LBS | HEIGHT: 72 IN | RESPIRATION RATE: 18 BRPM | BODY MASS INDEX: 29.45 KG/M2 | DIASTOLIC BLOOD PRESSURE: 82 MMHG

## 2024-11-21 DIAGNOSIS — Z34.91 NORMAL PREGNANCY IN FIRST TRIMESTER: Primary | ICD-10-CM

## 2024-11-21 DIAGNOSIS — Z34.90 PREGNANCY WITH UNCERTAIN DATES, ANTEPARTUM: ICD-10-CM

## 2024-11-21 DIAGNOSIS — O09.521 MULTIGRAVIDA OF ADVANCED MATERNAL AGE IN FIRST TRIMESTER: ICD-10-CM

## 2024-11-21 LAB
ERYTHROCYTE [DISTWIDTH] IN BLOOD BY AUTOMATED COUNT: 12.5 % (ref 10–15)
HCT VFR BLD AUTO: 40.8 % (ref 35–47)
HGB BLD-MCNC: 14.2 G/DL (ref 11.7–15.7)
MCH RBC QN AUTO: 31.8 PG (ref 26.5–33)
MCHC RBC AUTO-ENTMCNC: 34.8 G/DL (ref 31.5–36.5)
MCV RBC AUTO: 91 FL (ref 78–100)
PLATELET # BLD AUTO: 257 10E3/UL (ref 150–450)
RBC # BLD AUTO: 4.47 10E6/UL (ref 3.8–5.2)
WBC # BLD AUTO: 7.9 10E3/UL (ref 4–11)

## 2024-11-21 ASSESSMENT — PAIN SCALES - GENERAL: PAINLEVEL_OUTOF10: NO PAIN (0)

## 2024-11-21 NOTE — PROGRESS NOTES
"  HPI:  Karen Garcia is a 39 year old female  Patient's last menstrual period was 10/04/2024 (exact date). at 6w6d, Estimated Date of Delivery: 2025.  + nausea. She denies vaginal bleeding, vomiting, and abdominal pain.   H/o infertility but conceived naturally. No other c/o.    No past medical history on file.    Past Surgical History:   Procedure Laterality Date    DILATION AND CURETTAGE SUCTION N/A 2023    Procedure: DILATION AND CURETTAGE, UTERUS, USING SUCTION;  Surgeon: Jeevan Oconnell MD;  [facility]    EXCISE LESION HEAD N/A 5/10/2021    Procedure: Excision of Scalp Lesion;  Surgeon: Erik Bahena MD;  [facility]       Allergies: Patient has no known allergies.     ROS:   Denies fever, wt loss   Neg /GI other than per HPI      EXAM:  Blood pressure 134/82, pulse 78, resp. rate 18, height 1.854 m (6' 1\"), weight 98.6 kg (217 lb 6.4 oz), last menstrual period 10/04/2024, SpO2 98%, unknown if currently breastfeeding.   BMI= Body mass index is 28.68 kg/m .  General - pleasant female in no acute distress.  Abdomen - soft, nontender, nondistended, no hepatosplenomegaly.  Pelvic - EG: normal adult female, BUS: within normal limits,Rectovaginal - deferred.    US:    Transvaginal:Yes  Yolk sac present:Yes  CRL:  7mm  FCA present:Yes  EGA 6w 4d  NELY:cwd          ASSESSMENT/PLAN:  (Z34.91) Normal pregnancy in first trimester  (primary encounter diagnosis)  Comment: viable IUP with concordant dates  Plan: Hepatitis B surface antigen, Treponema Abs w         Reflex to RPR and Titer, HIV Antigen Antibody         Combo, Rubella Antibody IgG, ABO/Rh type and         screen, CBC with platelets, Hepatitis C         antibody, UA with Microscopic, Urine Culture         Aerobic Bacterial, Urine Drug Screen, US OB <         14 Weeks Single Transabdominal, US OB < 14         Weeks Single Transabdominal          F/up US ordered in [place] in 2-3 weeks to confirm continued viability and dates " given AMA/h/o miscarriage.      1st Trimester precautions and testing discussed.  New OB Labs ordered and exam scheduled.  Aneuploidy testing options discussed.  Patient has my card and phone number to call prn if problems in interim.    Jeevan Oconnell MD

## 2024-11-21 NOTE — PROGRESS NOTES
Writer reviewed the following education points/handouts with New OB patient: Your Guide To A Health Pregnancy Booklet Provided    [place] Welcome Sheet - Expectation for upcoming appointments and reasons to see your health care provider for regular prenatal care.   Patient's Bill of Rights  Taking Medicine During Pregnancy   [facility] pamphlet   [place] Department [employer] - [place] [facility] Safe Eating Guidelines  Prenatal Classes Offered   Genetic Testing, Routine Testing, & Ultrasound  Depression and Anxiety Screenings, Signs & Symptoms, Resources, and when to ask for help.   How your Body Changes: (Backache, Round Ligament Pain, Breast Changes, Constipation, Fatigue, Heartburn, Hemorrhoids, Joint Pain, Nausea etc.)  Healthy Diet/Lifestyle Choices   Avoiding Alcohol/Marijuana/Drug Use/Smoking or Vaping page    labor warning signs page   How Babies Grow and Change Scan and Play roger icons reviewed.   Answered all patients questions. Encourage patient to call back with any other questions and/or concerns. Contact Number Provided.    Writer reviewed the following education points/handouts with New OB patient: Your Guide To A Health Pregnancy Booklet Provided    [place] Welcome Sheet - Expectation for upcoming appointments and reasons to see your health care provider for regular prenatal care.   Patient's Bill of Rights  Taking Medicine During Pregnancy   [facility] pamphlet   [place] [employer] - [facility] Safe Eating Guidelines  Prenatal Classes Offered   Genetic Testing, Routine Testing, & Ultrasound  Depression and Anxiety Screenings, Signs & Symptoms, Resources, and when to ask for help.   How your Body Changes: (Backache, Round Ligament Pain, Breast Changes, Constipation, Fatigue, Heartburn, Hemorrhoids, Joint Pain, Nausea etc.)  Healthy Diet/Lifestyle Choices   Avoiding Alcohol/Marijuana/Drug Use/Smoking or  Vaping page    labor warning signs page   How Babies Grow and Change Scan and Play roger icons reviewed.   Answered all patients questions. Encourage patient to call back with any other questions and/or concerns. Contact Number Provided.

## 2024-11-21 NOTE — PROGRESS NOTES
Have you had or do you currently have:    - Diabetes? n  - Hypertension? n  - Heart disease, mitral valve prolapse, or rheumatic fever?  n  - An autoimmune disease such as lupus or rheumatoid arthritis?  n  - Kidney disease, urinary tract infection?  y  - Epilepsy, seizures, or spells?  n  - Migraine headaches?  y  - Any other neurological problems?  n  - Have you ever been treated for depression?  n  - Are you having problems with crying spells or loss of self-esteem?  n  - Have you ever required psychiatric care?  n  - Have you ever had hepatitis, liver disease, or jaundice?  n  - Have you ever been treated for blood clots in your veins, deep vein thrombosis, inflammation in the veins, thrombosis, phelbitis, pulmonary embolism or varicosities?  n  - Have you had excessive bleeding after surgery or dental work?  n  - Do you bleed more than other women after a cut or scratch?  n  - Do you have a history or anemia?  n  - Have you ever had thyroid problems or take thyroid medication?  n  - Do you have any endocrine problems?  n  - Have you every been in a major accident or suffered serious trauma?  n  - Within the last year, has anyone hit, slapped, kicked, or otherwise hurt you?  n  - In the last year, has anyone forced you to have sex when you didn't want to?  n  - Have you every received a blood transfusion?  n  - Would you refuse a blood transfusion if a doctor judged it to be medically necessary?  nn  - Does anyone in your home smoke? n  - Do you use tobacco products?  n  - Do you drink beer, wine, or hard liquor?  n  - Do you use any of the following: marijuana, speed, cocaine, heroin, hallucinogens, or other drugs?  n  - Is your blood type RH negative?  n  - Have you ever had asthma?  n  - Have you ever had tuberculosis?  n  - Do you have any allergies to drugs or over-the-counter medications?  n  - Allergies: dust mites, aspartame, ethanol, venlafaxine hydrochloride, sertraline?  n  - Have you had any breast  problems?  n  - Have you ever breast-fed?  n  - Have you had any gynecological surgical procedures such as cervical conization, LEEP, laser treatment, cryosurgery of the cervix, or a dilatation and curettage, etc?  n  - Have you ever had any other surgical procedures?  n  - Have you ever had any anesthetic complications?  n  - Have you ever had an abnormal pap smear?  n  - Do you have a history of abnormalities of the uterus? n  - Did your mother take ROSAURA or any other hormones when she was pregnant with you?  n  - Did it take you more than one year to become pregnant?  n  - Have you ever been evaluated or treated for infertility?  n  - Is there a history of medical problems in your family, which you feel might adversely affect your health or pregnancy?  n  - Do you have any other problems we have not asked about which you feel may be important to this pregnancy?  n    Symptoms since last menstrual period  - Do you currently have any of the following symptoms: abdominal pain, blood in the stool or urine, chest pain, shortness of breath, coughing or vomiting up blood, you heart is racing or skipping beats, nausea and vomiting, pain on urination, or vaginal discharge or bleeding?  n    Genetic screening  Has the patient, baby's father, or anyone in either family had:  - Thalassemia (Italian, Greek, Mediterranean, or  background only) and an MCV result less than 80?  n  - Neural tube defect such as meningomyelocele, spina bifida, or anencephaly?  n  - Congential heart defect?  n  - Down's syndrome?  n  - Yasmani-Sachs disease ( Baptism, Cajun, Icelandic-Floyd)?  n  - Sickle cell disease or trait () ?  n  - Hemophilia or other inherited problems of blood?  n  - Muscular dystrophy?  n  - Cystic fibrosis?  n  - [place]'s chorea?  n  - Mental retardation/autism?  n   If yes, was the person tested for Fragile X?  n  - Any other inherited genetic or chromosomal disorder?  n  - Maternal metabolic disorder (e.g.  insulin- dependent diabetes, PKU)?  n  - A child with birth defects not listed above?  n  - Recurrent pregnancy loss, or a stillbirth?  y  - Has the patient had any medications/street drugs/alcohol since her last menstrual period?  n  - Does the patient or baby's father have any other genetic risk?  n    Infection history  - Have you ever been treated for tuberculosis?  n  - Have you every had a positive skin test for tuberculosis?  n  - Do you live with someone who has tuberculosis?  n  - Have you ever been exposed to tuberculosis?  n  - Do you have genital herpes?  n  - Does your partner have genital herpes?  n  - Have you had a rash or viral illness since your last period?  n  - Have you ever had gonorrhea, chlamydia, syphilis, venereal warts, trichomoniasis, pelvic inflammatory disease, or any other sexually transmitted disease?  n  - Have you had chicken pox?  n  - Have you been vaccinated against chicken pox?  n  - Have you had any other infectious diseases?  n

## 2024-11-22 LAB — HIV 1+2 AB+HIV1 P24 AG SERPL QL IA: NONREACTIVE

## 2024-11-30 ENCOUNTER — HEALTH MAINTENANCE LETTER (OUTPATIENT)
Age: 39
End: 2024-11-30

## 2024-12-09 ENCOUNTER — TRANSFERRED RECORDS (OUTPATIENT)
Dept: HEALTH INFORMATION MANAGEMENT | Facility: CLINIC | Age: 39
End: 2024-12-09

## 2024-12-19 ENCOUNTER — PRENATAL OFFICE VISIT (OUTPATIENT)
Dept: OBGYN | Facility: OTHER | Age: 39
End: 2024-12-19
Attending: NURSE PRACTITIONER
Payer: COMMERCIAL

## 2024-12-19 VITALS
BODY MASS INDEX: 30.88 KG/M2 | HEIGHT: 72 IN | DIASTOLIC BLOOD PRESSURE: 68 MMHG | SYSTOLIC BLOOD PRESSURE: 112 MMHG | WEIGHT: 228 LBS

## 2024-12-19 DIAGNOSIS — Z12.4 SCREENING FOR MALIGNANT NEOPLASM OF CERVIX: ICD-10-CM

## 2024-12-19 DIAGNOSIS — O09.521 MULTIGRAVIDA OF ADVANCED MATERNAL AGE IN FIRST TRIMESTER: ICD-10-CM

## 2024-12-19 DIAGNOSIS — Z34.91 NORMAL PREGNANCY IN FIRST TRIMESTER: ICD-10-CM

## 2024-12-19 LAB
ALBUMIN UR-MCNC: NEGATIVE MG/DL
AMPHETAMINES UR QL SCN: NORMAL
APPEARANCE UR: CLEAR
BARBITURATES UR QL SCN: NORMAL
BENZODIAZ UR QL SCN: NORMAL
BILIRUB UR QL STRIP: NEGATIVE
BZE UR QL SCN: NORMAL
C TRACH DNA SPEC QL PROBE+SIG AMP: NEGATIVE
CANNABINOIDS UR QL SCN: NORMAL
COLOR UR AUTO: NORMAL
FENTANYL UR QL: NORMAL
GLUCOSE UR STRIP-MCNC: NEGATIVE MG/DL
HGB UR QL STRIP: NEGATIVE
KETONES UR STRIP-MCNC: NEGATIVE MG/DL
LEUKOCYTE ESTERASE UR QL STRIP: NEGATIVE
N GONORRHOEA DNA SPEC QL NAA+PROBE: NEGATIVE
NITRATE UR QL: NEGATIVE
OPIATES UR QL SCN: NORMAL
PCP QUAL URINE (ROCHE): NORMAL
PH UR STRIP: 6 [PH] (ref 4.7–8)
RBC URINE: 1 /HPF
SP GR UR STRIP: 1.01 (ref 1–1.03)
SQUAMOUS EPITHELIAL: 0 /HPF
UROBILINOGEN UR STRIP-MCNC: NORMAL MG/DL
WBC URINE: 3 /HPF

## 2024-12-19 ASSESSMENT — PAIN SCALES - GENERAL: PAINLEVEL_OUTOF10: NO PAIN (0)

## 2024-12-19 NOTE — PROGRESS NOTES
"  HPI:  Karen Garcia is a 39 year old female Patient's last menstrual period was 10/04/2024 (exact date). at 10w6d, Estimated Date of Delivery: Jul 11, 2025.  She denies vaginal bleeding, vomiting, and abdominal pain. Works at [place] [employer]. Hx of infertility but conceived on own.    No other c/o.    No past medical history on file.    Past Surgical History:   Procedure Laterality Date    DILATION AND CURETTAGE SUCTION N/A 6/7/2023    Procedure: DILATION AND CURETTAGE, UTERUS, USING SUCTION;  Surgeon: Jeevan Oconnell MD;  [facility]    EXCISE LESION HEAD N/A 5/10/2021    Procedure: Excision of Scalp Lesion;  Surgeon: Erik Bahena MD;  [facility]       Allergies: Patient has no known allergies.     EXAM:  Blood pressure 112/68, height 1.854 m (6' 1\"), weight 103.4 kg (228 lb), last menstrual period 10/04/2024, unknown if currently breastfeeding.   BMI= Body mass index is 30.08 kg/m .  General - pleasant female in no acute distress.  Neck - supple without lymphadenopathy or thyromegaly.  Lungs - clear to auscultation bilaterally.  Heart - regular rate and rhythm without murmur.  Abdomen - soft, nontender, nondistended, no hepatosplenomegaly.  Pelvic - EG: normal adult female, BUS: within normal limits, Vagina: well rugated, no discharge, Cervix: no lesions or CMT, closed/long Uterus: gravid, consistant with dates, mobile, Adnexae: no masses or tenderness.  Rectovaginal - deferred.  Musculoskeletal - no gross deformities.  Neurological - normal strength, sensation, and mental status.    Doptones were +    ASSESSMENT/PLAN:  (Z12.4) Screening for malignant neoplasm of cervix  (primary encounter diagnosis)  Comment:   Plan: HPV and Gynecologic Cytology Panel,         GC/Chlamydia by PCR - [facility]            (Z34.91) Normal pregnancy in first trimester  Comment:   Plan: [place] [facility]rier Screening-[place]         Non-Invasive Prenatal Screening-[place]            (O09.521) Multigravida of " advanced maternal age in first trimester  Comment:   Plan: return in 4 weeks.      Weight gain and exercise during pregnancy was discussed at today's visit.  The patient will return to clinic in 4 weeks for continued prenatal care.

## 2024-12-21 LAB — BACTERIA UR CULT: ABNORMAL

## 2024-12-23 DIAGNOSIS — N30.00 ACUTE CYSTITIS WITHOUT HEMATURIA: Primary | ICD-10-CM

## 2024-12-23 LAB
HPV HR 12 DNA CVX QL NAA+PROBE: NEGATIVE
HPV16 DNA CVX QL NAA+PROBE: NEGATIVE
HPV18 DNA CVX QL NAA+PROBE: NEGATIVE
HUMAN PAPILLOMA VIRUS FINAL DIAGNOSIS: NORMAL

## 2024-12-23 RX ORDER — CEPHALEXIN 500 MG/1
500 CAPSULE ORAL 3 TIMES DAILY
Qty: 21 CAPSULE | Refills: 0 | Status: SHIPPED | OUTPATIENT
Start: 2024-12-23 | End: 2024-12-30

## 2024-12-26 LAB
BKR AP ASSOCIATED HPV REPORT: NORMAL
BKR LAB AP GYN ADEQUACY: NORMAL
BKR LAB AP GYN INTERPRETATION: NORMAL
BKR LAB AP PREVIOUS ABNORMAL: NORMAL
PATH REPORT.COMMENTS IMP SPEC: NORMAL
PATH REPORT.COMMENTS IMP SPEC: NORMAL
PATH REPORT.RELEVANT HX SPEC: NORMAL

## 2025-01-02 LAB — SCANNED LAB RESULT: NORMAL

## 2025-01-06 LAB — SCANNED LAB RESULT: ABNORMAL

## 2025-01-17 ENCOUNTER — PRENATAL OFFICE VISIT (OUTPATIENT)
Dept: OBGYN | Facility: OTHER | Age: 40
End: 2025-01-17
Attending: NURSE PRACTITIONER
Payer: COMMERCIAL

## 2025-01-17 VITALS
DIASTOLIC BLOOD PRESSURE: 78 MMHG | HEIGHT: 72 IN | OXYGEN SATURATION: 98 % | HEART RATE: 80 BPM | WEIGHT: 228.2 LBS | BODY MASS INDEX: 30.91 KG/M2 | RESPIRATION RATE: 18 BRPM | SYSTOLIC BLOOD PRESSURE: 126 MMHG

## 2025-01-17 DIAGNOSIS — Z34.92 NORMAL PREGNANCY IN SECOND TRIMESTER: Primary | ICD-10-CM

## 2025-01-17 PROCEDURE — 99207 PR PRENATAL VISIT: CPT | Performed by: NURSE PRACTITIONER

## 2025-01-21 NOTE — PROGRESS NOTES
Doing well.  Denies concerns today.  Some flutters.  No cramping or bleeding.  Fito to do CF carrier screen today.  Discussed and ordered anatomy screen US.  Return in 4 weeks.    Yes

## 2025-01-27 ENCOUNTER — MEDICAL CORRESPONDENCE (OUTPATIENT)
Dept: HEALTH INFORMATION MANAGEMENT | Facility: CLINIC | Age: 40
End: 2025-01-27

## 2025-02-18 ENCOUNTER — PRENATAL OFFICE VISIT (OUTPATIENT)
Dept: OBGYN | Facility: OTHER | Age: 40
End: 2025-02-18
Attending: NURSE PRACTITIONER
Payer: COMMERCIAL

## 2025-02-18 VITALS — SYSTOLIC BLOOD PRESSURE: 116 MMHG | DIASTOLIC BLOOD PRESSURE: 62 MMHG | WEIGHT: 237 LBS | BODY MASS INDEX: 31.27 KG/M2

## 2025-02-18 DIAGNOSIS — O09.522 MULTIGRAVIDA OF ADVANCED MATERNAL AGE IN SECOND TRIMESTER: ICD-10-CM

## 2025-02-18 ASSESSMENT — PAIN SCALES - GENERAL: PAINLEVEL_OUTOF10: NO PAIN (0)

## 2025-03-03 ENCOUNTER — TRANSFERRED RECORDS (OUTPATIENT)
Dept: HEALTH INFORMATION MANAGEMENT | Facility: CLINIC | Age: 40
End: 2025-03-03

## 2025-03-11 DIAGNOSIS — Z34.92 NORMAL PREGNANCY IN SECOND TRIMESTER: Primary | ICD-10-CM

## 2025-03-20 ENCOUNTER — TRANSFERRED RECORDS (OUTPATIENT)
Dept: HEALTH INFORMATION MANAGEMENT | Facility: CLINIC | Age: 40
End: 2025-03-20

## 2025-04-15 ENCOUNTER — PRENATAL OFFICE VISIT (OUTPATIENT)
Dept: OBGYN | Facility: OTHER | Age: 40
End: 2025-04-15
Attending: OBSTETRICS & GYNECOLOGY
Payer: COMMERCIAL

## 2025-04-15 ENCOUNTER — LAB (OUTPATIENT)
Dept: LAB | Facility: OTHER | Age: 40
End: 2025-04-15
Attending: OBSTETRICS & GYNECOLOGY
Payer: COMMERCIAL

## 2025-04-15 VITALS
HEIGHT: 72 IN | SYSTOLIC BLOOD PRESSURE: 112 MMHG | DIASTOLIC BLOOD PRESSURE: 64 MMHG | HEART RATE: 88 BPM | BODY MASS INDEX: 33.28 KG/M2 | OXYGEN SATURATION: 98 % | WEIGHT: 245.7 LBS

## 2025-04-15 DIAGNOSIS — O09.522 MULTIGRAVIDA OF ADVANCED MATERNAL AGE IN SECOND TRIMESTER: ICD-10-CM

## 2025-04-15 DIAGNOSIS — O09.523 MULTIGRAVIDA OF ADVANCED MATERNAL AGE IN THIRD TRIMESTER: Primary | ICD-10-CM

## 2025-04-15 LAB
BLD GP AB SCN SERPL QL: NEGATIVE
ERYTHROCYTE [DISTWIDTH] IN BLOOD BY AUTOMATED COUNT: 13 % (ref 10–15)
GLUCOSE 1H P 50 G GLC PO SERPL-MCNC: 141 MG/DL (ref 70–129)
HCT VFR BLD AUTO: 36.7 % (ref 35–47)
HGB BLD-MCNC: 12.7 G/DL (ref 11.7–15.7)
MCH RBC QN AUTO: 32 PG (ref 26.5–33)
MCHC RBC AUTO-ENTMCNC: 34.6 G/DL (ref 31.5–36.5)
MCV RBC AUTO: 92 FL (ref 78–100)
PLATELET # BLD AUTO: 244 10E3/UL (ref 150–450)
RBC # BLD AUTO: 3.97 10E6/UL (ref 3.8–5.2)
SPECIMEN EXP DATE BLD: NORMAL
WBC # BLD AUTO: 11.6 10E3/UL (ref 4–11)

## 2025-04-15 PROCEDURE — 86850 RBC ANTIBODY SCREEN: CPT

## 2025-04-15 PROCEDURE — 82950 GLUCOSE TEST: CPT

## 2025-04-15 PROCEDURE — 86780 TREPONEMA PALLIDUM: CPT

## 2025-04-15 PROCEDURE — 85027 COMPLETE CBC AUTOMATED: CPT

## 2025-04-15 PROCEDURE — 36415 COLL VENOUS BLD VENIPUNCTURE: CPT

## 2025-04-15 ASSESSMENT — PAIN SCALES - GENERAL: PAINLEVEL_OUTOF10: NO PAIN (0)

## 2025-04-15 NOTE — PROGRESS NOTES
Doing well.  No concerns today.  Some thoracic back pain.  Seeing chiropractor.   1 hour GTT done today along with other necessary labs.  Prenatal flowsheet information is reviewed.  Discussed kick counts and fetal movement.  RTC in 2 weeks  US growth ordered 32 weeks    Denies PTL sx, vb, lof  Jeevan Oconnell MD  4/15/2025

## 2025-04-16 LAB — T PALLIDUM AB SER QL: NONREACTIVE

## 2025-04-24 ENCOUNTER — TRANSFERRED RECORDS (OUTPATIENT)
Dept: HEALTH INFORMATION MANAGEMENT | Facility: CLINIC | Age: 40
End: 2025-04-24

## 2025-05-01 ENCOUNTER — PRENATAL OFFICE VISIT (OUTPATIENT)
Dept: OBGYN | Facility: OTHER | Age: 40
End: 2025-05-01
Attending: OBSTETRICS & GYNECOLOGY
Payer: COMMERCIAL

## 2025-05-01 VITALS
BODY MASS INDEX: 32.59 KG/M2 | DIASTOLIC BLOOD PRESSURE: 64 MMHG | RESPIRATION RATE: 15 BRPM | OXYGEN SATURATION: 97 % | SYSTOLIC BLOOD PRESSURE: 116 MMHG | HEART RATE: 78 BPM | WEIGHT: 247 LBS

## 2025-05-01 DIAGNOSIS — O09.523 MULTIGRAVIDA OF ADVANCED MATERNAL AGE IN THIRD TRIMESTER: ICD-10-CM

## 2025-05-01 DIAGNOSIS — Z23 NEED FOR VACCINATION: Primary | ICD-10-CM

## 2025-05-01 PROCEDURE — 90471 IMMUNIZATION ADMIN: CPT | Performed by: OBSTETRICS & GYNECOLOGY

## 2025-05-01 PROCEDURE — 0502F SUBSEQUENT PRENATAL CARE: CPT | Performed by: OBSTETRICS & GYNECOLOGY

## 2025-05-01 PROCEDURE — 3078F DIAST BP <80 MM HG: CPT | Performed by: OBSTETRICS & GYNECOLOGY

## 2025-05-01 PROCEDURE — 90715 TDAP VACCINE 7 YRS/> IM: CPT | Performed by: OBSTETRICS & GYNECOLOGY

## 2025-05-01 PROCEDURE — 99207 PR PRENATAL VISIT: CPT | Performed by: OBSTETRICS & GYNECOLOGY

## 2025-05-01 PROCEDURE — 1126F AMNT PAIN NOTED NONE PRSNT: CPT | Performed by: OBSTETRICS & GYNECOLOGY

## 2025-05-01 PROCEDURE — 3074F SYST BP LT 130 MM HG: CPT | Performed by: OBSTETRICS & GYNECOLOGY

## 2025-05-01 ASSESSMENT — PAIN SCALES - GENERAL: PAINLEVEL_OUTOF10: NO PAIN (0)

## 2025-05-03 NOTE — PROGRESS NOTES
Doing well.  No concerns today.  Passed 3 hr GTT  Prenatal flowsheet information is reviewed.  Discussed kick counts and fetal movement.  RTC in 2 weeks  TDAP done    Denies PTL yahaira, lara, chely Oconnell MD  5/3/2025

## 2025-05-13 ENCOUNTER — TRANSFERRED RECORDS (OUTPATIENT)
Dept: HEALTH INFORMATION MANAGEMENT | Facility: CLINIC | Age: 40
End: 2025-05-13

## 2025-05-13 ENCOUNTER — PRENATAL OFFICE VISIT (OUTPATIENT)
Dept: OBGYN | Facility: OTHER | Age: 40
End: 2025-05-13
Attending: OBSTETRICS & GYNECOLOGY
Payer: COMMERCIAL

## 2025-05-13 VITALS — WEIGHT: 246 LBS | SYSTOLIC BLOOD PRESSURE: 116 MMHG | BODY MASS INDEX: 32.46 KG/M2 | DIASTOLIC BLOOD PRESSURE: 66 MMHG

## 2025-05-13 DIAGNOSIS — O09.523 MULTIGRAVIDA OF ADVANCED MATERNAL AGE IN THIRD TRIMESTER: ICD-10-CM

## 2025-05-13 ASSESSMENT — PAIN SCALES - GENERAL: PAINLEVEL_OUTOF10: NO PAIN (0)

## 2025-05-13 NOTE — PROGRESS NOTES
Doing well.  No concerns today.  US growth today.   Prenatal flowsheet information is reviewed.  Discussed kick counts and fetal movement.  RTC in 2 weeks  Denies PTL yahaira, lara, chely Oconnell MD  5/13/2025

## 2025-05-27 ENCOUNTER — RESULTS FOLLOW-UP (OUTPATIENT)
Dept: OBGYN | Facility: CLINIC | Age: 40
End: 2025-05-27

## 2025-05-27 ENCOUNTER — PRENATAL OFFICE VISIT (OUTPATIENT)
Dept: OBGYN | Facility: OTHER | Age: 40
End: 2025-05-27
Attending: OBSTETRICS & GYNECOLOGY
Payer: COMMERCIAL

## 2025-05-27 VITALS
DIASTOLIC BLOOD PRESSURE: 76 MMHG | WEIGHT: 253.6 LBS | OXYGEN SATURATION: 98 % | HEIGHT: 72 IN | BODY MASS INDEX: 34.35 KG/M2 | SYSTOLIC BLOOD PRESSURE: 128 MMHG | HEART RATE: 83 BPM

## 2025-05-27 DIAGNOSIS — O12.03 GESTATIONAL EDEMA IN THIRD TRIMESTER: ICD-10-CM

## 2025-05-27 DIAGNOSIS — O26.03 EXCESSIVE WEIGHT GAIN DURING PREGNANCY IN THIRD TRIMESTER: ICD-10-CM

## 2025-05-27 DIAGNOSIS — O09.523 MULTIGRAVIDA OF ADVANCED MATERNAL AGE IN THIRD TRIMESTER: ICD-10-CM

## 2025-05-27 LAB
ALBUMIN MFR UR ELPH: 12.1 MG/DL
ALBUMIN SERPL BCG-MCNC: 3.3 G/DL (ref 3.5–5.2)
ALP SERPL-CCNC: 94 U/L (ref 40–150)
ALT SERPL W P-5'-P-CCNC: 14 U/L (ref 0–50)
ANION GAP SERPL CALCULATED.3IONS-SCNC: 10 MMOL/L (ref 7–15)
AST SERPL W P-5'-P-CCNC: 14 U/L (ref 0–45)
BILIRUB SERPL-MCNC: 0.2 MG/DL
BUN SERPL-MCNC: 5 MG/DL (ref 6–20)
CALCIUM SERPL-MCNC: 8.7 MG/DL (ref 8.8–10.4)
CHLORIDE SERPL-SCNC: 104 MMOL/L (ref 98–107)
CREAT SERPL-MCNC: 0.67 MG/DL (ref 0.51–0.95)
CREAT UR-MCNC: 29.5 MG/DL
EGFRCR SERPLBLD CKD-EPI 2021: >90 ML/MIN/1.73M2
ERYTHROCYTE [DISTWIDTH] IN BLOOD BY AUTOMATED COUNT: 13.2 % (ref 10–15)
GLUCOSE SERPL-MCNC: 110 MG/DL (ref 70–99)
HCO3 SERPL-SCNC: 21 MMOL/L (ref 22–29)
HCT VFR BLD AUTO: 36 % (ref 35–47)
HGB BLD-MCNC: 12.5 G/DL (ref 11.7–15.7)
MCH RBC QN AUTO: 32.1 PG (ref 26.5–33)
MCHC RBC AUTO-ENTMCNC: 34.7 G/DL (ref 31.5–36.5)
MCV RBC AUTO: 93 FL (ref 78–100)
PLATELET # BLD AUTO: 224 10E3/UL (ref 150–450)
POTASSIUM SERPL-SCNC: 3.4 MMOL/L (ref 3.4–5.3)
PROT SERPL-MCNC: 6.2 G/DL (ref 6.4–8.3)
PROT/CREAT 24H UR: 0.41 MG/MG CR (ref 0–0.2)
RBC # BLD AUTO: 3.89 10E6/UL (ref 3.8–5.2)
SODIUM SERPL-SCNC: 135 MMOL/L (ref 135–145)
TSH SERPL DL<=0.005 MIU/L-ACNC: 2.2 UIU/ML (ref 0.3–4.2)
WBC # BLD AUTO: 13.3 10E3/UL (ref 4–11)

## 2025-05-27 ASSESSMENT — PAIN SCALES - GENERAL: PAINLEVEL_OUTOF10: NO PAIN (0)

## 2025-05-27 NOTE — PROGRESS NOTES
Faxed [facility]/Radiology Department with written BPP orders per .  Face sheet sent as well.  YARITZA MEJIA RN

## 2025-05-28 DIAGNOSIS — O09.522 MULTIGRAVIDA OF ADVANCED MATERNAL AGE IN SECOND TRIMESTER: Primary | ICD-10-CM

## 2025-05-29 ENCOUNTER — HOSPITAL ENCOUNTER (OUTPATIENT)
Facility: HOSPITAL | Age: 40
Discharge: HOME | End: 2025-05-29
Attending: OBSTETRICS & GYNECOLOGY | Admitting: OBSTETRICS & GYNECOLOGY
Payer: COMMERCIAL

## 2025-05-29 ENCOUNTER — ANCILLARY PROCEDURE (OUTPATIENT)
Dept: GENERAL RADIOLOGY | Facility: HOSPITAL | Age: 40
End: 2025-05-29
Attending: OBSTETRICS & GYNECOLOGY
Payer: COMMERCIAL

## 2025-05-29 ENCOUNTER — APPOINTMENT (OUTPATIENT)
Dept: LAB | Facility: OTHER | Age: 40
End: 2025-05-29
Attending: OBSTETRICS & GYNECOLOGY
Payer: COMMERCIAL

## 2025-05-29 ENCOUNTER — PRENATAL OFFICE VISIT (OUTPATIENT)
Dept: OBGYN | Facility: OTHER | Age: 40
End: 2025-05-29
Attending: OBSTETRICS & GYNECOLOGY
Payer: COMMERCIAL

## 2025-05-29 VITALS
HEART RATE: 91 BPM | OXYGEN SATURATION: 95 % | SYSTOLIC BLOOD PRESSURE: 119 MMHG | DIASTOLIC BLOOD PRESSURE: 65 MMHG | TEMPERATURE: 98.7 F | RESPIRATION RATE: 18 BRPM

## 2025-05-29 VITALS
HEIGHT: 72 IN | OXYGEN SATURATION: 96 % | BODY MASS INDEX: 34.32 KG/M2 | SYSTOLIC BLOOD PRESSURE: 128 MMHG | DIASTOLIC BLOOD PRESSURE: 72 MMHG | WEIGHT: 253.4 LBS | HEART RATE: 86 BPM

## 2025-05-29 DIAGNOSIS — R06.02 SHORTNESS OF BREATH: ICD-10-CM

## 2025-05-29 DIAGNOSIS — O14.90 PREECLAMPSIA: ICD-10-CM

## 2025-05-29 DIAGNOSIS — O09.521 MULTIGRAVIDA OF ADVANCED MATERNAL AGE IN FIRST TRIMESTER: ICD-10-CM

## 2025-05-29 DIAGNOSIS — O14.90 PREECLAMPSIA: Primary | ICD-10-CM

## 2025-05-29 DIAGNOSIS — O09.522 MULTIGRAVIDA OF ADVANCED MATERNAL AGE IN SECOND TRIMESTER: ICD-10-CM

## 2025-05-29 PROBLEM — Z36.89 ENCOUNTER FOR TRIAGE IN PREGNANT PATIENT: Status: ACTIVE | Noted: 2025-05-29

## 2025-05-29 LAB
ALBUMIN UR-MCNC: NEGATIVE MG/DL
ALBUMIN UR-MCNC: NEGATIVE MG/DL
APPEARANCE UR: ABNORMAL
APPEARANCE UR: ABNORMAL
BACTERIA #/AREA URNS HPF: ABNORMAL /HPF
BACTERIA #/AREA URNS HPF: ABNORMAL /HPF
BILIRUB UR QL STRIP: NEGATIVE
BILIRUB UR QL STRIP: NEGATIVE
COLOR UR AUTO: ABNORMAL
COLOR UR AUTO: ABNORMAL
GLUCOSE UR STRIP-MCNC: NEGATIVE MG/DL
GLUCOSE UR STRIP-MCNC: NEGATIVE MG/DL
HGB UR QL STRIP: NEGATIVE
HGB UR QL STRIP: NEGATIVE
KETONES UR STRIP-MCNC: 10 MG/DL
KETONES UR STRIP-MCNC: NEGATIVE MG/DL
LEUKOCYTE ESTERASE UR QL STRIP: ABNORMAL
LEUKOCYTE ESTERASE UR QL STRIP: ABNORMAL
MUCOUS THREADS #/AREA URNS LPF: PRESENT /LPF
NITRATE UR QL: NEGATIVE
NITRATE UR QL: NEGATIVE
PH UR STRIP: 6.5 [PH] (ref 4.7–8)
PH UR STRIP: 6.5 [PH] (ref 4.7–8)
RBC URINE: 1 /HPF
RBC URINE: <1 /HPF
SP GR UR STRIP: 1.01 (ref 1–1.03)
SP GR UR STRIP: 1.01 (ref 1–1.03)
SQUAMOUS EPITHELIAL: 15 /HPF
SQUAMOUS EPITHELIAL: 26 /HPF
UROBILINOGEN UR STRIP-MCNC: NORMAL MG/DL
UROBILINOGEN UR STRIP-MCNC: NORMAL MG/DL
WBC URINE: 14 /HPF
WBC URINE: 14 /HPF

## 2025-05-29 PROCEDURE — 81001 URINALYSIS AUTO W/SCOPE: CPT | Performed by: OBSTETRICS & GYNECOLOGY

## 2025-05-29 PROCEDURE — 71046 X-RAY EXAM CHEST 2 VIEWS: CPT

## 2025-05-29 PROCEDURE — 59025 FETAL NON-STRESS TEST: CPT | Mod: 26 | Performed by: OBSTETRICS & GYNECOLOGY

## 2025-05-29 PROCEDURE — 71046 X-RAY EXAM CHEST 2 VIEWS: CPT | Mod: 26 | Performed by: RADIOLOGY

## 2025-05-29 PROCEDURE — 59025 FETAL NON-STRESS TEST: CPT

## 2025-05-29 PROCEDURE — G0463 HOSPITAL OUTPT CLINIC VISIT: HCPCS | Mod: 25

## 2025-05-29 RX ORDER — LIDOCAINE 40 MG/G
CREAM TOPICAL
Status: DISCONTINUED | OUTPATIENT
Start: 2025-05-29 | End: 2025-05-29 | Stop reason: HOSPADM

## 2025-05-29 ASSESSMENT — ACTIVITIES OF DAILY LIVING (ADL)
ADLS_ACUITY_SCORE: 41
ADLS_ACUITY_SCORE: 41

## 2025-05-29 ASSESSMENT — PAIN SCALES - GENERAL: PAINLEVEL_OUTOF10: NO PAIN (0)

## 2025-05-29 NOTE — PLAN OF CARE
Karen Garcia        NST:  reactive  Start: 1543  Stop: 1603    Physician: Dr. Oconnell  Reason For Test: R/O Pre-eclampsia  Estimated Date of Delivery: Jul 11, 2025   Gestational Age: 33w6d     Verified with second RN: Mildred Fenton RN

## 2025-05-29 NOTE — PROGRESS NOTES
Patient given a home BP cuff related to preeclampsia.  Instructions for monitoring given via AVS.  Patient signed home medical equipment purchase agreement.  Patient going to get CXR next and then heading to [facility] for NST and serial BPs while waiting for results.  YARITZA MEJIA RN

## 2025-05-29 NOTE — PLAN OF CARE
Data: Patient presented to [facility]: 2025  3:42 PM.  Patient sent to unit from clinic for rule out preeclampsia. Patient reports new lower leg edema with a 7lb weight gain in the last few weeks. Patient denies uterine contractions, leaking of vaginal fluid/rupture of membranes, vaginal bleeding, abdominal pain, pelvic pressure, nausea, vomiting, headache, visual disturbances, epigastric or RUQ pain. Patient reports fetal movement is normal. Patient is a 33w6d .  Prenatal record reviewed. Pregnancy has been complicated by advanced maternal age (>=36yo).    Vital signs wnl. Support person is present.     Action: Verbal consent for EFM. Triage assessment completed.     Response: Plan per Dr. Oconnell is NST, serial BPs and UA. Patient verbalized agreement with plan.

## 2025-05-29 NOTE — PLAN OF CARE
Data: Patient assessed in the [facility] for R/O pre-eclampsia from the clinic. Cervical exam deferred. Membranes intact. Contractions are not present. See flowsheets for fetal assessment documentation.     Action: Presumed adequate fetal oxygenation documented. Discharge instructions reviewed. Patient instructed to report change in fetal movement, vaginal leaking of fluid or bleeding, abdominal pain, or any concerns related to the pregnancy to provider/clinic. UA sent to lab, MD minor discharging patient without lab resulting.     Response: Orders to discharge home per Dr. Oconnell. Patient verbalized understanding of education and agreement with plan. Discharged to home at 1717.

## 2025-05-30 ENCOUNTER — RESULTS FOLLOW-UP (OUTPATIENT)
Dept: OBGYN | Facility: CLINIC | Age: 40
End: 2025-05-30

## 2025-05-31 ENCOUNTER — RESULTS FOLLOW-UP (OUTPATIENT)
Dept: OBGYN | Facility: CLINIC | Age: 40
End: 2025-05-31

## 2025-05-31 DIAGNOSIS — N30.00 ACUTE CYSTITIS WITHOUT HEMATURIA: Primary | ICD-10-CM

## 2025-05-31 RX ORDER — NITROFURANTOIN 25; 75 MG/1; MG/1
100 CAPSULE ORAL 2 TIMES DAILY
Qty: 14 CAPSULE | Refills: 0 | Status: SHIPPED | OUTPATIENT
Start: 2025-05-31 | End: 2025-06-07

## 2025-06-03 ENCOUNTER — PRENATAL OFFICE VISIT (OUTPATIENT)
Dept: OBGYN | Facility: OTHER | Age: 40
End: 2025-06-03
Attending: OBSTETRICS & GYNECOLOGY
Payer: COMMERCIAL

## 2025-06-03 VITALS
DIASTOLIC BLOOD PRESSURE: 64 MMHG | OXYGEN SATURATION: 97 % | HEART RATE: 92 BPM | HEIGHT: 72 IN | WEIGHT: 254.6 LBS | BODY MASS INDEX: 34.48 KG/M2 | SYSTOLIC BLOOD PRESSURE: 108 MMHG

## 2025-06-03 DIAGNOSIS — N30.00 ACUTE CYSTITIS WITHOUT HEMATURIA: Primary | ICD-10-CM

## 2025-06-03 DIAGNOSIS — O09.523 MULTIGRAVIDA OF ADVANCED MATERNAL AGE IN THIRD TRIMESTER: ICD-10-CM

## 2025-06-03 ASSESSMENT — PAIN SCALES - GENERAL: PAINLEVEL_OUTOF10: NO PAIN (0)

## 2025-06-03 NOTE — PROGRESS NOTES
Doing well.  No concerns today.  UA + for UTI which explains proteinuria in absence of other preeclampsia sx.  BP have been completely nml.  Medication R/B discussed with pt.  Wkly BPP starting next week and f/up US growth scheduled.   Discussed kick counts and fetal movement.  RTC in 2 weeks  Denies PTL sx, vb, chely Oconnell MD  6/3/2025

## 2025-06-03 NOTE — LETTER
Kayla 3, 2025      Karen Garcia  [address]  [place] [facility] [place] [facility] [address]        To Whom It May Concern:    Karen Garcia was seen in our clinic. She may return to {WORK OR SCHOOL OR :[id number]} without restrictions.      Sincerely,        Jeevan Oconnell MD    Electronically signed

## 2025-06-06 NOTE — PROGRESS NOTES
Fetal Non-Stress Test Results    NST Ordered By: Dr. Oconnell  NST Medical Indication: R/O Preeclampsia triage    NST Start & Stop Times  NST Start Time: 1543  NST Stop Time: 1603                            NST Results  Fetus A   Baseline Rate: Normal  Accelerations: Present  Decelerations: None  Interpretation: reactive

## 2025-06-10 ENCOUNTER — TRANSFERRED RECORDS (OUTPATIENT)
Dept: HEALTH INFORMATION MANAGEMENT | Facility: CLINIC | Age: 40
End: 2025-06-10

## 2025-06-17 ENCOUNTER — TRANSFERRED RECORDS (OUTPATIENT)
Dept: HEALTH INFORMATION MANAGEMENT | Facility: HOSPITAL | Age: 40
End: 2025-06-17

## 2025-06-17 ENCOUNTER — PRENATAL OFFICE VISIT (OUTPATIENT)
Dept: OBGYN | Facility: OTHER | Age: 40
End: 2025-06-17
Attending: OBSTETRICS & GYNECOLOGY
Payer: COMMERCIAL

## 2025-06-17 VITALS
DIASTOLIC BLOOD PRESSURE: 72 MMHG | OXYGEN SATURATION: 96 % | WEIGHT: 254.9 LBS | SYSTOLIC BLOOD PRESSURE: 112 MMHG | BODY MASS INDEX: 34.52 KG/M2 | HEIGHT: 72 IN | HEART RATE: 76 BPM

## 2025-06-17 DIAGNOSIS — N30.00 ACUTE CYSTITIS WITHOUT HEMATURIA: ICD-10-CM

## 2025-06-17 DIAGNOSIS — O09.523 MULTIGRAVIDA OF ADVANCED MATERNAL AGE IN THIRD TRIMESTER: Primary | ICD-10-CM

## 2025-06-17 LAB
ALBUMIN UR-MCNC: NEGATIVE MG/DL
APPEARANCE UR: CLEAR
BILIRUB UR QL STRIP: NEGATIVE
COLOR UR AUTO: ABNORMAL
GLUCOSE UR STRIP-MCNC: NEGATIVE MG/DL
HGB UR QL STRIP: NEGATIVE
KETONES UR STRIP-MCNC: 10 MG/DL
LEUKOCYTE ESTERASE UR QL STRIP: NEGATIVE
MUCOUS THREADS #/AREA URNS LPF: PRESENT /LPF
NITRATE UR QL: NEGATIVE
PH UR STRIP: 6.5 [PH] (ref 4.7–8)
RBC URINE: 1 /HPF
SP GR UR STRIP: 1.01 (ref 1–1.03)
SQUAMOUS EPITHELIAL: 5 /HPF
UROBILINOGEN UR STRIP-MCNC: NORMAL MG/DL
WBC URINE: 4 /HPF

## 2025-06-17 ASSESSMENT — PAIN SCALES - GENERAL: PAINLEVEL_OUTOF10: NO PAIN (0)

## 2025-06-18 ENCOUNTER — RESULTS FOLLOW-UP (OUTPATIENT)
Dept: SURGERY | Facility: CLINIC | Age: 40
End: 2025-06-18

## 2025-06-18 LAB — GP B STREP DNA SPEC QL NAA+PROBE: NEGATIVE

## 2025-06-18 NOTE — PROGRESS NOTES
Doing well.  No concerns today. Neg PIH sx  GBS Done today.  Prenatal flowsheet information is reviewed.  Reportable signs and symptoms discussed.  Return to clinic in  week  BPP 8/8  EFW 3566 LGA  Denies regular contractions, vaginal bleeding, DEBBY Oconnell MD  6/18/2025

## 2025-06-19 LAB — BACTERIA UR CULT: NORMAL

## 2025-06-24 ENCOUNTER — HOSPITAL ENCOUNTER (OUTPATIENT)
Facility: HOSPITAL | Age: 40
Discharge: HOME | End: 2025-06-24
Attending: OBSTETRICS & GYNECOLOGY | Admitting: OBSTETRICS & GYNECOLOGY
Payer: COMMERCIAL

## 2025-06-24 ENCOUNTER — PRENATAL OFFICE VISIT (OUTPATIENT)
Dept: OBGYN | Facility: OTHER | Age: 40
End: 2025-06-24
Attending: OBSTETRICS & GYNECOLOGY
Payer: COMMERCIAL

## 2025-06-24 VITALS
DIASTOLIC BLOOD PRESSURE: 69 MMHG | SYSTOLIC BLOOD PRESSURE: 125 MMHG | TEMPERATURE: 97.6 F | RESPIRATION RATE: 18 BRPM | OXYGEN SATURATION: 98 % | HEART RATE: 66 BPM

## 2025-06-24 VITALS
DIASTOLIC BLOOD PRESSURE: 82 MMHG | HEART RATE: 74 BPM | WEIGHT: 256.1 LBS | OXYGEN SATURATION: 98 % | SYSTOLIC BLOOD PRESSURE: 134 MMHG | BODY MASS INDEX: 33.79 KG/M2

## 2025-06-24 DIAGNOSIS — O09.521 MULTIGRAVIDA OF ADVANCED MATERNAL AGE IN FIRST TRIMESTER: ICD-10-CM

## 2025-06-24 PROCEDURE — 59025 FETAL NON-STRESS TEST: CPT

## 2025-06-24 ASSESSMENT — ACTIVITIES OF DAILY LIVING (ADL)
ADLS_ACUITY_SCORE: 41

## 2025-06-24 NOTE — PLAN OF CARE
Karen Garcia        NST:  reactive  Start: 1600  Stop: 1630    Physician: Dr Oconnell  Reason For Test: BPP 6/8, AMA  Estimated Date of Delivery: Jul 11, 2025   Gestational Age: 37w4d     Verified with second RN: Nicholas MONTEIRO    Comments:  Dr Oconnell walked pt from Clinic for NST for BPP 6/8 and ok to discharge if reactive.  NST complete and reactive.  AVS reviewed with pt and significant other, who both verbalized understanding.  Pt discharged ambulatory with significant other.        Diane Gaston RN

## 2025-06-24 NOTE — PROGRESS NOTES
Doing well.  No concerns today.  Discussed signs of labor and when to call or come in.  Discussed kick counts and fetal movement.  Please call if persistent HA, blurred vision, or swelling  Biophysical profile ordered. This study returned 6/8 (breathing).  NST pending.   RTC in 1 week  Denies regular contractions, vaginal bleeding, DEBBY Oconnell MD  6/24/2025

## 2025-06-24 NOTE — DISCHARGE INSTRUCTIONS
Learning About When to Call Your Doctor During Pregnancy (After 20 Weeks)  Overview  It's common to have concerns about what might be a problem when you're pregnant. Most pregnancies don't have any serious problems. But it's still important to know when to call your doctor if you have certain symptoms or signs of labor.  These are general suggestions. Your doctor may give you some more information about when to call.  When to call your doctor (after 20 weeks)  Call 911 anytime you think you may need emergency care. For example, call if:  You have severe vaginal bleeding. You have soaked through one or more pads in an hour, and the bleeding is not slowing down.  You have sudden, severe pain in your belly that does not go away.  You have chest pain, are short of breath, or cough up blood.  You passed out (lost consciousness).  You have a seizure.  You see or feel the umbilical cord.  You think you are about to deliver your baby and can't make it safely to the hospital or birthing center.  Call your doctor now or seek immediate medical care if:  You have vaginal bleeding.  You have belly pain.  You have a fever.  You are dizzy or lightheaded, or you feel like you may faint.  You have signs of a blood clot in your leg (called a deep vein thrombosis), such as:  Pain in the calf, back of the knee, thigh, or groin.  Swelling in your leg or groin.  A color change on the leg or groin. The skin may be reddish or purplish.  You have symptoms of preeclampsia, such as:  Sudden swelling of your face, hands, or feet.  New vision problems (such as dimness, blurring, or seeing spots).  A severe headache that will not go away.  You have a sudden release or slow trickle of fluid from your vagina. This may mean your water has broken.  You've been having regular contractions for an hour at less than 37 weeks. This means that you've had at least 6 contractions within 1 hour, even after you change your position and drink fluids.  You  "notice that your baby has stopped moving or is moving less than normal.  You have signs of heart failure, such as:  New or increased shortness of breath.  New or worse swelling in your legs, ankles, or feet.  Sudden weight gain, such as more than 2 to 3 pounds in a day or 5 pounds in a week.  Feeling so tired or weak that you cannot do your usual activities.  You have symptoms of a urinary tract infection. These may include:  Pain or burning when you urinate.  A frequent need to urinate without being able to pass much urine.  Pain in your low back (below the rib cage and above the waist).  Blood in your urine.  Watch closely for changes in your health, and be sure to contact your doctor if:  You have vaginal discharge that smells bad.  You feel sad, anxious, or hopeless for more than a few days.  You have skin changes, such as a rash, itching, or a yellow color to your skin.  You have other concerns about your pregnancy.  If you have labor signs at 37 weeks or more  If you have signs of labor at 37 weeks or more, your doctor may tell you to call when your labor becomes more active. During active labor:  Contractions happen more often and at regular intervals (about every 3 to 5 minutes).  Contractions last longer (about 60 seconds or more).  Contractions get stronger and are hard to talk through.  Follow-up care is a key part of your treatment and safety. Be sure to make and go to all appointments, and call your doctor if you are having problems. It's also a good idea to know your test results and keep a list of the medicines you take.  Where can you learn more?  Go to https://www.[employer].net/patiented  Enter N531 in the search box to learn more about \"Learning About When to Call Your Doctor During Pregnancy (After 20 Weeks).\"  Current as of: April 30, 2024  Content Version: 14.5    7900-7254 [employer] [facility] Flash [employer].   Care instructions adapted under license by your healthcare professional. If you have questions " about a medical condition or this instruction, always ask your healthcare professional. [email or web address] disclaims any warranty or liability for your use of this information.

## 2025-07-01 ENCOUNTER — PRENATAL OFFICE VISIT (OUTPATIENT)
Dept: OBGYN | Facility: OTHER | Age: 40
End: 2025-07-01
Attending: OBSTETRICS & GYNECOLOGY
Payer: MEDICAID

## 2025-07-01 ENCOUNTER — TRANSFERRED RECORDS (OUTPATIENT)
Dept: HEALTH INFORMATION MANAGEMENT | Facility: CLINIC | Age: 40
End: 2025-07-01

## 2025-07-01 VITALS — DIASTOLIC BLOOD PRESSURE: 70 MMHG | SYSTOLIC BLOOD PRESSURE: 122 MMHG | WEIGHT: 254 LBS | BODY MASS INDEX: 33.51 KG/M2

## 2025-07-01 DIAGNOSIS — O09.523 MULTIGRAVIDA OF ADVANCED MATERNAL AGE IN THIRD TRIMESTER: ICD-10-CM

## 2025-07-01 PROCEDURE — G0463 HOSPITAL OUTPT CLINIC VISIT: HCPCS | Mod: 25

## 2025-07-01 ASSESSMENT — PAIN SCALES - GENERAL: PAINLEVEL_OUTOF10: NO PAIN (0)

## 2025-07-02 NOTE — PROGRESS NOTES
Doing well.  No concerns today.  Cervix check next visit.  Discussed signs of labor and when to call or come in.  Please call if persistent HA, blurred vision, or swelling  She will report to OB if contractions every 5-10 minutes or if rupture of membranes.  RTC in 1 week  BPP 8/8 today  Denies regular contractions, vaginal bleeding, DEBBY Oconnell MD  7/2/2025

## 2025-07-08 ENCOUNTER — PRENATAL OFFICE VISIT (OUTPATIENT)
Dept: OBGYN | Facility: OTHER | Age: 40
End: 2025-07-08
Attending: OBSTETRICS & GYNECOLOGY
Payer: MEDICAID

## 2025-07-08 ENCOUNTER — HOSPITAL ENCOUNTER (OUTPATIENT)
Dept: ULTRASOUND IMAGING | Facility: HOSPITAL | Age: 40
Discharge: HOME | End: 2025-07-08
Attending: OBSTETRICS & GYNECOLOGY
Payer: MEDICAID

## 2025-07-08 VITALS
DIASTOLIC BLOOD PRESSURE: 74 MMHG | BODY MASS INDEX: 33.91 KG/M2 | OXYGEN SATURATION: 98 % | WEIGHT: 257 LBS | SYSTOLIC BLOOD PRESSURE: 114 MMHG | HEART RATE: 73 BPM

## 2025-07-08 DIAGNOSIS — O09.523 MULTIGRAVIDA OF ADVANCED MATERNAL AGE IN THIRD TRIMESTER: ICD-10-CM

## 2025-07-08 PROCEDURE — G0463 HOSPITAL OUTPT CLINIC VISIT: HCPCS | Mod: 25

## 2025-07-08 PROCEDURE — 76816 OB US FOLLOW-UP PER FETUS: CPT

## 2025-07-08 PROCEDURE — 76816 OB US FOLLOW-UP PER FETUS: CPT | Mod: 26 | Performed by: STUDENT IN AN ORGANIZED HEALTH CARE EDUCATION/TRAINING PROGRAM

## 2025-07-08 PROCEDURE — 76819 FETAL BIOPHYS PROFIL W/O NST: CPT | Mod: 26 | Performed by: STUDENT IN AN ORGANIZED HEALTH CARE EDUCATION/TRAINING PROGRAM

## 2025-07-09 NOTE — PROGRESS NOTES
Doing well.  No concerns today.  Discussed kick counts and fetal movement.  Please call if persistent HA, blurred vision, or swelling  She will report to OB if contractions every 5-10 minutes or if rupture of membranes.  RTC in 1 week  US growth and BPP today  Denies regular contractions, vaginal bleeding, DEBBY Oconnell MD  7/9/2025

## 2025-07-15 ENCOUNTER — TRANSFERRED RECORDS (OUTPATIENT)
Dept: HEALTH INFORMATION MANAGEMENT | Facility: CLINIC | Age: 40
End: 2025-07-15

## 2025-07-15 ENCOUNTER — PRENATAL OFFICE VISIT (OUTPATIENT)
Dept: OBGYN | Facility: OTHER | Age: 40
End: 2025-07-15
Attending: OBSTETRICS & GYNECOLOGY
Payer: MEDICAID

## 2025-07-15 VITALS
SYSTOLIC BLOOD PRESSURE: 122 MMHG | WEIGHT: 254.4 LBS | BODY MASS INDEX: 33.56 KG/M2 | DIASTOLIC BLOOD PRESSURE: 76 MMHG | OXYGEN SATURATION: 99 % | HEART RATE: 74 BPM

## 2025-07-15 DIAGNOSIS — O12.03 GESTATIONAL EDEMA IN THIRD TRIMESTER: Primary | ICD-10-CM

## 2025-07-15 DIAGNOSIS — O09.513 AMA (ADVANCED MATERNAL AGE) PRIMIGRAVIDA 35+, THIRD TRIMESTER: ICD-10-CM

## 2025-07-15 DIAGNOSIS — O35.EXX0 PYELECTASIS OF FETUS ON PRENATAL ULTRASOUND: ICD-10-CM

## 2025-07-15 DIAGNOSIS — Z34.03 PREGNANCY, SUPERVISION OF FIRST, THIRD TRIMESTER: ICD-10-CM

## 2025-07-15 DIAGNOSIS — O48.0 POST TERM PREGNANCY OVER 40 WEEKS: ICD-10-CM

## 2025-07-15 PROCEDURE — G0463 HOSPITAL OUTPT CLINIC VISIT: HCPCS

## 2025-07-16 RX ORDER — HYDROXYZINE HYDROCHLORIDE 50 MG/1
50 TABLET, FILM COATED ORAL
Status: CANCELLED | OUTPATIENT
Start: 2025-07-16

## 2025-07-16 RX ORDER — ACETAMINOPHEN 325 MG/1
975 TABLET ORAL EVERY 6 HOURS PRN
Status: CANCELLED | OUTPATIENT
Start: 2025-07-16

## 2025-07-16 NOTE — PROGRESS NOTES
Doing well.  No concerns today.  Cervix  is unfavorable.  140%/-3/-soft/posterior. .  Discussed signs of labor and when to call or come in.  Discussed kick counts and fetal movement.  Please call if persistent HA, blurred vision, or swelling  She will report to OB if contractions every 5-10 minutes or if rupture of membranes.  Patient is aware of the risks of an induction and delivery with LGA  including medications used, risk, benefits and alternatives.  She is willing to take these risks and would like to proceed and was scheduled for cervical ripening 25 followed by IOL at 41 weeks.    Mild fetal pyelectasis on US last wk and LGA.  Notify peds at delivery,  US.    Denies regular contractions, vaginal bleeding, DEBBY Oconnell MD  2025

## 2025-07-17 ENCOUNTER — HOSPITAL ENCOUNTER (INPATIENT)
Facility: HOSPITAL | Age: 40
LOS: 4 days | Discharge: HOME | End: 2025-07-21
Attending: OBSTETRICS & GYNECOLOGY | Admitting: OBSTETRICS & GYNECOLOGY
Payer: MEDICAID

## 2025-07-17 DIAGNOSIS — O13.3 GESTATIONAL HYPERTENSION, THIRD TRIMESTER: ICD-10-CM

## 2025-07-17 DIAGNOSIS — O09.521 MULTIGRAVIDA OF ADVANCED MATERNAL AGE IN FIRST TRIMESTER: ICD-10-CM

## 2025-07-17 LAB
ABO + RH BLD: NORMAL
ALT SERPL W P-5'-P-CCNC: 17 U/L (ref 0–50)
AST SERPL W P-5'-P-CCNC: 21 U/L (ref 0–45)
BLD GP AB SCN SERPL QL: NEGATIVE
CREAT SERPL-MCNC: 0.75 MG/DL (ref 0.51–0.95)
EGFRCR SERPLBLD CKD-EPI 2021: >90 ML/MIN/1.73M2
ERYTHROCYTE [DISTWIDTH] IN BLOOD BY AUTOMATED COUNT: 13.5 % (ref 10–15)
HCT VFR BLD AUTO: 37.8 % (ref 35–47)
HGB BLD-MCNC: 13.2 G/DL (ref 11.7–15.7)
MCH RBC QN AUTO: 32 PG (ref 26.5–33)
MCHC RBC AUTO-ENTMCNC: 34.9 G/DL (ref 31.5–36.5)
MCV RBC AUTO: 92 FL (ref 78–100)
PLATELET # BLD AUTO: 228 10E3/UL (ref 150–450)
RBC # BLD AUTO: 4.13 10E6/UL (ref 3.8–5.2)
SPECIMEN EXP DATE BLD: NORMAL
WBC # BLD AUTO: 13.3 10E3/UL (ref 4–11)

## 2025-07-17 PROCEDURE — 84450 TRANSFERASE (AST) (SGOT): CPT | Performed by: OBSTETRICS & GYNECOLOGY

## 2025-07-17 PROCEDURE — 36415 COLL VENOUS BLD VENIPUNCTURE: CPT | Performed by: OBSTETRICS & GYNECOLOGY

## 2025-07-17 PROCEDURE — 120N000001 HC R&B MED SURG/OB

## 2025-07-17 PROCEDURE — 86900 BLOOD TYPING SEROLOGIC ABO: CPT | Performed by: OBSTETRICS & GYNECOLOGY

## 2025-07-17 PROCEDURE — 85014 HEMATOCRIT: CPT | Performed by: OBSTETRICS & GYNECOLOGY

## 2025-07-17 PROCEDURE — 84460 ALANINE AMINO (ALT) (SGPT): CPT | Performed by: OBSTETRICS & GYNECOLOGY

## 2025-07-17 PROCEDURE — 86780 TREPONEMA PALLIDUM: CPT | Performed by: OBSTETRICS & GYNECOLOGY

## 2025-07-17 PROCEDURE — 250N000013 HC RX MED GY IP 250 OP 250 PS 637: Performed by: OBSTETRICS & GYNECOLOGY

## 2025-07-17 PROCEDURE — 82565 ASSAY OF CREATININE: CPT | Performed by: OBSTETRICS & GYNECOLOGY

## 2025-07-17 RX ORDER — LOPERAMIDE HYDROCHLORIDE 2 MG/1
2 CAPSULE ORAL
Status: DISCONTINUED | OUTPATIENT
Start: 2025-07-17 | End: 2025-07-19 | Stop reason: HOSPADM

## 2025-07-17 RX ORDER — KETOROLAC TROMETHAMINE 15 MG/ML
15 INJECTION, SOLUTION INTRAMUSCULAR; INTRAVENOUS
Status: COMPLETED | OUTPATIENT
Start: 2025-07-17 | End: 2025-07-19

## 2025-07-17 RX ORDER — TERBUTALINE SULFATE 1 MG/ML
0.25 INJECTION SUBCUTANEOUS
Status: DISCONTINUED | OUTPATIENT
Start: 2025-07-17 | End: 2025-07-19 | Stop reason: HOSPADM

## 2025-07-17 RX ORDER — OXYTOCIN/0.9 % SODIUM CHLORIDE 30/500 ML
100-340 PLASTIC BAG, INJECTION (ML) INTRAVENOUS CONTINUOUS PRN
Status: DISCONTINUED | OUTPATIENT
Start: 2025-07-17 | End: 2025-07-21 | Stop reason: HOSPADM

## 2025-07-17 RX ORDER — ONDANSETRON 2 MG/ML
4 INJECTION INTRAMUSCULAR; INTRAVENOUS EVERY 6 HOURS PRN
Status: DISCONTINUED | OUTPATIENT
Start: 2025-07-17 | End: 2025-07-19 | Stop reason: HOSPADM

## 2025-07-17 RX ORDER — HYDRALAZINE HYDROCHLORIDE 20 MG/ML
10 INJECTION INTRAMUSCULAR; INTRAVENOUS
Status: DISCONTINUED | OUTPATIENT
Start: 2025-07-17 | End: 2025-07-21 | Stop reason: HOSPADM

## 2025-07-17 RX ORDER — SODIUM CHLORIDE, SODIUM LACTATE, POTASSIUM CHLORIDE, CALCIUM CHLORIDE 600; 310; 30; 20 MG/100ML; MG/100ML; MG/100ML; MG/100ML
10-125 INJECTION, SOLUTION INTRAVENOUS CONTINUOUS
Status: DISCONTINUED | OUTPATIENT
Start: 2025-07-17 | End: 2025-07-21 | Stop reason: HOSPADM

## 2025-07-17 RX ORDER — ONDANSETRON 4 MG/1
4 TABLET, ORALLY DISINTEGRATING ORAL EVERY 6 HOURS PRN
Status: DISCONTINUED | OUTPATIENT
Start: 2025-07-17 | End: 2025-07-19 | Stop reason: HOSPADM

## 2025-07-17 RX ORDER — FAMOTIDINE 20 MG/1
20 TABLET, FILM COATED ORAL 2 TIMES DAILY
Status: DISCONTINUED | OUTPATIENT
Start: 2025-07-17 | End: 2025-07-21 | Stop reason: HOSPADM

## 2025-07-17 RX ORDER — LOPERAMIDE HYDROCHLORIDE 2 MG/1
4 CAPSULE ORAL
Status: DISCONTINUED | OUTPATIENT
Start: 2025-07-17 | End: 2025-07-19 | Stop reason: HOSPADM

## 2025-07-17 RX ORDER — IBUPROFEN 800 MG/1
800 TABLET, FILM COATED ORAL
Status: COMPLETED | OUTPATIENT
Start: 2025-07-17 | End: 2025-07-19

## 2025-07-17 RX ORDER — OXYTOCIN 10 [USP'U]/ML
10 INJECTION, SOLUTION INTRAMUSCULAR; INTRAVENOUS
Status: DISCONTINUED | OUTPATIENT
Start: 2025-07-17 | End: 2025-07-21 | Stop reason: HOSPADM

## 2025-07-17 RX ORDER — OXYTOCIN/0.9 % SODIUM CHLORIDE 30/500 ML
340 PLASTIC BAG, INJECTION (ML) INTRAVENOUS CONTINUOUS PRN
Status: DISCONTINUED | OUTPATIENT
Start: 2025-07-17 | End: 2025-07-19 | Stop reason: HOSPADM

## 2025-07-17 RX ORDER — MISOPROSTOL 200 UG/1
400 TABLET ORAL
Status: DISCONTINUED | OUTPATIENT
Start: 2025-07-17 | End: 2025-07-19 | Stop reason: HOSPADM

## 2025-07-17 RX ORDER — PROCHLORPERAZINE MALEATE 10 MG
10 TABLET ORAL EVERY 6 HOURS PRN
Status: DISCONTINUED | OUTPATIENT
Start: 2025-07-17 | End: 2025-07-19 | Stop reason: HOSPADM

## 2025-07-17 RX ORDER — LIDOCAINE 40 MG/G
CREAM TOPICAL
Status: DISCONTINUED | OUTPATIENT
Start: 2025-07-17 | End: 2025-07-21 | Stop reason: HOSPADM

## 2025-07-17 RX ORDER — LABETALOL HYDROCHLORIDE 5 MG/ML
20-80 INJECTION, SOLUTION INTRAVENOUS EVERY 10 MIN PRN
Status: DISCONTINUED | OUTPATIENT
Start: 2025-07-17 | End: 2025-07-21 | Stop reason: HOSPADM

## 2025-07-17 RX ORDER — OXYTOCIN 10 [USP'U]/ML
10 INJECTION, SOLUTION INTRAMUSCULAR; INTRAVENOUS
Status: DISCONTINUED | OUTPATIENT
Start: 2025-07-17 | End: 2025-07-19 | Stop reason: HOSPADM

## 2025-07-17 RX ORDER — METHYLERGONOVINE MALEATE 0.2 MG/ML
200 INJECTION INTRAVENOUS
Status: DISCONTINUED | OUTPATIENT
Start: 2025-07-17 | End: 2025-07-19 | Stop reason: HOSPADM

## 2025-07-17 RX ORDER — HYDROXYZINE HYDROCHLORIDE 25 MG/1
50 TABLET, FILM COATED ORAL
Status: DISCONTINUED | OUTPATIENT
Start: 2025-07-17 | End: 2025-07-17

## 2025-07-17 RX ORDER — ACETAMINOPHEN 325 MG/1
975 TABLET ORAL EVERY 6 HOURS PRN
Status: DISCONTINUED | OUTPATIENT
Start: 2025-07-17 | End: 2025-07-19 | Stop reason: HOSPADM

## 2025-07-17 RX ORDER — METOCLOPRAMIDE 10 MG/1
10 TABLET ORAL EVERY 6 HOURS PRN
Status: DISCONTINUED | OUTPATIENT
Start: 2025-07-17 | End: 2025-07-19 | Stop reason: HOSPADM

## 2025-07-17 RX ORDER — CITRIC ACID/SODIUM CITRATE 334-500MG
30 SOLUTION, ORAL ORAL
Status: DISCONTINUED | OUTPATIENT
Start: 2025-07-17 | End: 2025-07-19 | Stop reason: HOSPADM

## 2025-07-17 RX ORDER — TRANEXAMIC ACID 10 MG/ML
1 INJECTION, SOLUTION INTRAVENOUS EVERY 30 MIN PRN
Status: DISCONTINUED | OUTPATIENT
Start: 2025-07-17 | End: 2025-07-19 | Stop reason: HOSPADM

## 2025-07-17 RX ORDER — METOCLOPRAMIDE HYDROCHLORIDE 5 MG/ML
10 INJECTION INTRAMUSCULAR; INTRAVENOUS EVERY 6 HOURS PRN
Status: DISCONTINUED | OUTPATIENT
Start: 2025-07-17 | End: 2025-07-19 | Stop reason: HOSPADM

## 2025-07-17 RX ORDER — CARBOPROST TROMETHAMINE 250 UG/ML
250 INJECTION, SOLUTION INTRAMUSCULAR
Status: DISCONTINUED | OUTPATIENT
Start: 2025-07-17 | End: 2025-07-19 | Stop reason: HOSPADM

## 2025-07-17 RX ORDER — HYDROXYZINE HYDROCHLORIDE 25 MG/1
50-100 TABLET, FILM COATED ORAL
Status: DISCONTINUED | OUTPATIENT
Start: 2025-07-17 | End: 2025-07-19 | Stop reason: HOSPADM

## 2025-07-17 RX ADMIN — DINOPROSTONE 10 MG: 10 INSERT VAGINAL at 20:07

## 2025-07-17 RX ADMIN — FAMOTIDINE 20 MG: 20 TABLET, FILM COATED ORAL at 21:21

## 2025-07-17 ASSESSMENT — ACTIVITIES OF DAILY LIVING (ADL)
ADLS_ACUITY_SCORE: 15

## 2025-07-18 ENCOUNTER — ANESTHESIA EVENT (OUTPATIENT)
Dept: OBGYN | Facility: HOSPITAL | Age: 40
End: 2025-07-18
Payer: MEDICAID

## 2025-07-18 ENCOUNTER — ANESTHESIA (OUTPATIENT)
Dept: OBGYN | Facility: HOSPITAL | Age: 40
End: 2025-07-18
Payer: MEDICAID

## 2025-07-18 LAB
ALBUMIN MFR UR ELPH: 7.4 MG/DL
CREAT UR-MCNC: 97 MG/DL
PROT/CREAT 24H UR: 0.08 MG/MG CR (ref 0–0.2)
T PALLIDUM AB SER QL: NONREACTIVE

## 2025-07-18 PROCEDURE — 84156 ASSAY OF PROTEIN URINE: CPT | Performed by: OBSTETRICS & GYNECOLOGY

## 2025-07-18 PROCEDURE — 258N000003 HC RX IP 258 OP 636: Performed by: OBSTETRICS & GYNECOLOGY

## 2025-07-18 PROCEDURE — 120N000001 HC R&B MED SURG/OB

## 2025-07-18 PROCEDURE — 250N000011 HC RX IP 250 OP 636: Performed by: NURSE ANESTHETIST, CERTIFIED REGISTERED

## 2025-07-18 PROCEDURE — 250N000013 HC RX MED GY IP 250 OP 250 PS 637: Performed by: OBSTETRICS & GYNECOLOGY

## 2025-07-18 RX ORDER — NALOXONE HYDROCHLORIDE 0.4 MG/ML
0.4 INJECTION, SOLUTION INTRAMUSCULAR; INTRAVENOUS; SUBCUTANEOUS
Status: DISCONTINUED | OUTPATIENT
Start: 2025-07-18 | End: 2025-07-21 | Stop reason: HOSPADM

## 2025-07-18 RX ORDER — NALBUPHINE HYDROCHLORIDE 10 MG/ML
2.5-5 INJECTION INTRAMUSCULAR; INTRAVENOUS; SUBCUTANEOUS EVERY 6 HOURS PRN
Status: DISCONTINUED | OUTPATIENT
Start: 2025-07-18 | End: 2025-07-21 | Stop reason: HOSPADM

## 2025-07-18 RX ORDER — NALOXONE HYDROCHLORIDE 0.4 MG/ML
0.2 INJECTION, SOLUTION INTRAMUSCULAR; INTRAVENOUS; SUBCUTANEOUS
Status: DISCONTINUED | OUTPATIENT
Start: 2025-07-18 | End: 2025-07-21 | Stop reason: HOSPADM

## 2025-07-18 RX ORDER — ONDANSETRON 2 MG/ML
4 INJECTION INTRAMUSCULAR; INTRAVENOUS EVERY 6 HOURS PRN
Status: DISCONTINUED | OUTPATIENT
Start: 2025-07-18 | End: 2025-07-19 | Stop reason: HOSPADM

## 2025-07-18 RX ORDER — LIDOCAINE HYDROCHLORIDE AND EPINEPHRINE 15; 5 MG/ML; UG/ML
3 INJECTION, SOLUTION EPIDURAL
Status: DISCONTINUED | OUTPATIENT
Start: 2025-07-18 | End: 2025-07-19 | Stop reason: HOSPADM

## 2025-07-18 RX ORDER — FENTANYL CITRATE-0.9 % NACL/PF 10 MCG/ML
100 PLASTIC BAG, INJECTION (ML) INTRAVENOUS EVERY 5 MIN PRN
Status: DISCONTINUED | OUTPATIENT
Start: 2025-07-18 | End: 2025-07-19 | Stop reason: HOSPADM

## 2025-07-18 RX ORDER — ROPIVACAINE HYDROCHLORIDE 7.5 MG/ML
INJECTION, SOLUTION EPIDURAL; PERINEURAL
Status: COMPLETED
Start: 2025-07-18 | End: 2025-07-18

## 2025-07-18 RX ORDER — ONDANSETRON 4 MG/1
4 TABLET, ORALLY DISINTEGRATING ORAL EVERY 6 HOURS PRN
Status: DISCONTINUED | OUTPATIENT
Start: 2025-07-18 | End: 2025-07-19 | Stop reason: HOSPADM

## 2025-07-18 RX ORDER — MISOPROSTOL 100 UG/1
25 TABLET ORAL EVERY 4 HOURS
Status: COMPLETED | OUTPATIENT
Start: 2025-07-18 | End: 2025-07-19

## 2025-07-18 RX ORDER — ROPIVACAINE HYDROCHLORIDE 7.5 MG/ML
INJECTION, SOLUTION EPIDURAL; PERINEURAL PRN
Status: DISCONTINUED | OUTPATIENT
Start: 2025-07-18 | End: 2025-07-19

## 2025-07-18 RX ORDER — FENTANYL CITRATE 50 UG/ML
100 INJECTION, SOLUTION INTRAMUSCULAR; INTRAVENOUS ONCE
Refills: 0 | Status: COMPLETED | OUTPATIENT
Start: 2025-07-18 | End: 2025-07-18

## 2025-07-18 RX ADMIN — FENTANYL CITRATE 100 MCG: 50 INJECTION INTRAMUSCULAR; INTRAVENOUS at 21:05

## 2025-07-18 RX ADMIN — ROPIVACAINE HYDROCHLORIDE 6 ML: 7.5 INJECTION, SOLUTION EPIDURAL; PERINEURAL at 21:19

## 2025-07-18 RX ADMIN — SODIUM CHLORIDE, POTASSIUM CHLORIDE, SODIUM LACTATE AND CALCIUM CHLORIDE 50 ML/HR: 600; 310; 30; 20 INJECTION, SOLUTION INTRAVENOUS at 21:24

## 2025-07-18 RX ADMIN — Medication 25 MCG: at 09:56

## 2025-07-18 RX ADMIN — SODIUM CHLORIDE, POTASSIUM CHLORIDE, SODIUM LACTATE AND CALCIUM CHLORIDE 500 ML: 600; 310; 30; 20 INJECTION, SOLUTION INTRAVENOUS at 20:29

## 2025-07-18 RX ADMIN — ROPIVACAINE HYDROCHLORIDE 3 ML: 7.5 INJECTION, SOLUTION EPIDURAL; PERINEURAL at 21:07

## 2025-07-18 RX ADMIN — FAMOTIDINE 20 MG: 20 TABLET, FILM COATED ORAL at 13:24

## 2025-07-18 RX ADMIN — HYDROXYZINE HYDROCHLORIDE 50 MG: 25 TABLET, FILM COATED ORAL at 00:13

## 2025-07-18 RX ADMIN — Medication 25 MCG: at 14:15

## 2025-07-18 RX ADMIN — Medication: at 21:19

## 2025-07-18 ASSESSMENT — ACTIVITIES OF DAILY LIVING (ADL)
ADLS_ACUITY_SCORE: 15
ADLS_ACUITY_SCORE: 16
ADLS_ACUITY_SCORE: 15

## 2025-07-18 NOTE — PLAN OF CARE
"/82 (BP Location: Left arm, Patient Position: Supine, Cuff Size: Adult Regular)   Pulse 72   Temp 98.2  F (36.8  C) (Oral)   Resp 16   Ht 1.854 m (6' 1\")   Wt 115.2 kg (254 lb)   LMP 10/04/2024 (Exact Date)   SpO2 98%   BMI 33.51 kg/m      Labor Shift Note  Signs and symptoms of infection Absent.    Complications in labor: Absent. Support person,  Fito, present.  Pt starting to feel contractions more after second dose of cytotec, also states they are more like a 4-5/10 now, pt breathing through contractions.   Interventions: Continue uterine/fetal assessment per orders and nursing discretion. Vital Signs per order set. Comfort measures/pain control/labor management: Ambulation, hydration, position changes, birthing ball and tub options to facilitate labor.  Pain medication options of Fentanyl IV and epidural reviewed with pt. Pt is unsure of decisions on them yet - seeing how labor goes first.   Plan: Anticipate . Provide labor/coping assistance as needed by patient and support person.  Observe for and notify care provider of indications of progressing labor, need for pain medications, or signs of fetal/maternal compromise.    Goal Outcome Evaluation:      Plan of Care Reviewed With: patient    Overall Patient Progress: improvingOverall Patient Progress: improving    Outcome Evaluation: Progressing per plan of care.      "

## 2025-07-18 NOTE — PLAN OF CARE
Pt got up to bathroom and whole william monitor patch (even though it had tape over it) fell of of pt. Pt now off monitor, plan to replace patch when pt gets out of tub - pt got back in after using the restroom. Explained to pt that I will come listen to baby with a doppler to check HR every once in a while now that monitor is completely off.

## 2025-07-18 NOTE — PROGRESS NOTES
[facility]  Labor Progress Note    S: Patient is feeling well this morning. Was able to sleep overnight. Noticing more contractions this morning, rating contraction discomfort 3/10 currently. Was happy not to have heartburn overnight!    O:   Patient Vitals for the past 4 hrs:   BP Temp Temp src Pulse Resp SpO2   25 0724 117/69 97.3  F (36.3  C) Oral 88 16 97 %     General: appearing comfortable between contractions  Resp: breathing comfortably on room air  SVE: 1.5/60/-3    FHT: Baseline 140, moderate variability, accelerations present, no decelerations  [place]: 2-3 contractions in 10 minutes    A/P:  Ms. Karen Garcia is a 40 year old  at 41w0d by LMP c/w 6w4d US here for an induction of labor for late term gestation.    # Labor  - S/p cervidil overnight, will start cytotec now  - Desires epidural for analgesia   - Anticipate      # FWB  - Category 1 FHT. Continue EFM and toco   - EFW 9.75 lb     PNC: Rh +, Rubella immune, GBS negative, , Placenta posterior     Lainey Kim MD

## 2025-07-18 NOTE — PLAN OF CARE
"Pt. Able to get sleep. States \"I wake up to some of the contractions but can go right back to sleep\". Up to the bathroom. Reports a small amount of pink tinged discharge on her toilet paper and says her underwear are a little damp but unsure if it is sweat. Will continue to monitor and assess.       Goal Outcome Evaluation:      Plan of Care Reviewed With: patient    Overall Patient Progress: improvingOverall Patient Progress: improving    Outcome Evaluation: Progressing in labor          "

## 2025-07-18 NOTE — PLAN OF CARE
Spoke with Dr. Kim, pt requesting to get in tub for pain control. Per Dr. Kim, will try to get william on pt and in the tub, otherwise OK with being unmonitored in tub if william not working.     SVE to be done when out of tub per MD.

## 2025-07-18 NOTE — PLAN OF CARE
Report given with opportunity to observe patient.    Report given to Monet Davila RN   7/18/2025  7:11 AM

## 2025-07-18 NOTE — PLAN OF CARE
"/69 (BP Location: Left arm, Patient Position: Semi-Dominguez's, Cuff Size: Adult Regular)   Pulse 88   Temp 97.3  F (36.3  C) (Oral)   Resp 16   Ht 1.854 m (6' 1\")   Wt 115.2 kg (254 lb)   LMP 10/04/2024 (Exact Date)   SpO2 97%   BMI 33.51 kg/m      Pt comfortable in room, cervidil taken out at 12 hours. Pt is essie irregularly, rates them a 3/10 intermittent, declines any intervention, states she is coping well. Per report obtained, MD will be in to see pt this AM, awaiting this to have pt checked and form plan for the day. Pt ordered breakfast. See flowsheets for Fetal/uterine activity. PT unsure on pain control plans, states she will be playing it by ear and deciding as the day goes - available options explained to pt.  in room  - supportive of pt. No signs of rupture or infection present. Pt reports some pinkish tinged discharge when in the bathroom.     Goal Outcome Evaluation:      Plan of Care Reviewed With: patient    Overall Patient Progress: improvingOverall Patient Progress: improving    Outcome Evaluation: Progressing per plan of care.          "

## 2025-07-18 NOTE — H&P
[facility]   OB History and Physical   Karen Garcia MRN# [id number]   Age: 40 year old YOB: 1985   CC: Induction of labor      HPI:   Ms. Karen Garcia is a 40 year old  at 40w6d by LMP c/w 6w4d US, who presents tonight for a scheduled induction of labor.  She has been doing and feeling ok overall. Has continues to have swelling but feels like it is a little better tonight than it has been recently. She denies regular contractions, vaginal bleeding, and loss of fluid. + normal fetal movement.      Pregnancy Complications:   - LGA fetus, extrapolated EFW ~ 4500 g  - Bilateral fetal pyelectasis, measuring up to 8 mm bilaterally  - AMA  - Failed GCT, passed GTT     OB History   OB History    Para Term  AB Living   2 0 0 0 0 0   SAB IAB Ectopic Multiple Live Births   0 0 0 0 0      # Outcome Date GA Lbr Magan/2nd Weight Sex Type Anes PTL Lv   2 Current            1                PMHx: Denies     PSHx:    Past Surgical History:   Procedure Laterality Date    DILATION AND CURETTAGE SUCTION N/A 2023    Procedure: DILATION AND CURETTAGE, UTERUS, USING SUCTION;  Surgeon: Jeevan Oconnell MD;  Location: [facility] OR    EXCISE LESION HEAD N/A 5/10/2021    Procedure: Excision of Scalp Lesion;  Surgeon: Erik Bahena MD;  Location: [facility] OR      Meds:    Medications Prior to Admission   Medication Sig Dispense Refill Last Dose/Taking    Prenatal Vit-Fe Fumarate-FA (PRENATAL MULTIVITAMIN W/IRON) 27-0.8 MG tablet Take 1 tablet by mouth daily   2025 at  8:00 AM    triamcinolone (KENALOG) 0.1 % external cream Apply topically 2 times daily 80 g 1 Past Month      Allergies: No Known Allergies     FmHx:   Family History   Problem Relation Age of Onset    Throat cancer Father     Melanoma Maternal Grandfather     Coronary Artery Disease Paternal Grandfather     Heart Disease Paternal Grandfather     Brain Cancer Brother       SocHx: She denies any tobacco, alcohol, or other  "drug use during this pregnancy.   ROS: Complete 10-point ROS negative except as noted in HPI. She denies headache, blurry vision, chest pain, shortness of breath, RUQ pain, nausea, vomiting, dysuria, hematuria or extremity edema.      PE:   Vit: Patient Vitals for the past 4 hrs:   BP Temp Temp src Pulse Resp SpO2 Height Weight   25 127/73 -- -- -- -- -- -- --   25 (!) 140/77 -- -- -- -- 98 % -- --   25 (!) 140/77 98.2  F (36.8  C) Oral 79 16 98 % 1.854 m (6' 1\") 115.2 kg (254 lb)      Gen: Well-appearing, NAD, comfortable   CV: rrr, no mrg   Pulm: Ctab, no wheezes or crackles   Abd: Soft, gravid, non-tender   Ext: 1+ LE edema b/l   Cx: /-3 per RN      Pres: cephalic by BSUS   EFW: 9.75 by extrapolated US   Memb: intact      FHT: Baseline 135, moderate variability, + accelerations, no decelerations   [place]: 1-2 contractions in 10 minutes      Assessment/Plan   Ms. Karen Garcia is a 40 year old , at 40w6d by LMP c/w 6w4d US, who presents for a scheduled induction of labor for late term gestation     # Labor  - Cervidil placed for ripening, will reassess in AM or sooner as indicated  - Desires epidural for analgesia   - Anticipate      # FWB  - Category 1 FHT. Continue EFM and toco   - EFW 9.75 lb     PNC: Rh +, Rubella immune, GBS negative, , Placenta posterior      Lainey Kim MD    "

## 2025-07-18 NOTE — PLAN OF CARE
Called Dr. Kim with update of no change since before first cytotec dose. Per Dr. Kim, second dose of cytotec given, whether things start picking up and she is making change or if again no change within 4 hours, will look at other methods to try post 4 hours after this second dose.    Plan explained to pt and pt agreeable. Pt declined any further questions.

## 2025-07-18 NOTE — PLAN OF CARE
Pt. Doing well. Reports being tired. Denies vaginal bleeding or leaking of fluids. AVSS. FHT's category 1. Cervidil in place. Verified EFM orders with Dr. Kim and orders obtained to monitor according to the order sets. Plans to take some vistaril and plans to rest after monitoring. Will continue to assess.    Goal Outcome Evaluation:      Plan of Care Reviewed With: patient    Overall Patient Progress: improvingOverall Patient Progress: improving    Outcome Evaluation: Progressing in labor

## 2025-07-18 NOTE — PLAN OF CARE
Data: Patient admitted to room 4240 at 1915. Patient is a . Prenatal record reviewed.   OB History    Para Term  AB Living   2 0 0 0 0 0   SAB IAB Ectopic Multiple Live Births   0 0 0 0 0      # Outcome Date GA Lbr Magan/2nd Weight Sex Type Anes PTL Lv   2 Current            1             .  Medical History: History reviewed. No pertinent past medical history..  Gestational age 40w6d. Vital signs per doc flowsheet. Fetal movement present. Patient reports here for cervical ripening before induction of labor as reason for admission. Support persons Fito present.  Action: Verbal consent for EFM, external fetal monitors applied. Admission assessment completed. Patient and support persons educated on labor process. Patient instructed to report change in fetal movement, contractions, vaginal leaking of fluid or bleeding, abdominal pain, or any concerns related to the pregnancy to her nurse/physician. Patient oriented to room, call light in reach.   Response: Dr. Kim informed of patient and arrival. Plan per provider is for nurse to place cervidil after SVE and monitor patient according to orders.. Patient verbalized understanding of education and verbalized agreement with plan.

## 2025-07-19 PROCEDURE — 258N000003 HC RX IP 258 OP 636: Performed by: OBSTETRICS & GYNECOLOGY

## 2025-07-19 PROCEDURE — 3E0R3NZ INTRODUCTION OF ANALGESICS, HYPNOTICS, SEDATIVES INTO SPINAL CANAL, PERCUTANEOUS APPROACH: ICD-10-PCS | Performed by: OBSTETRICS & GYNECOLOGY

## 2025-07-19 PROCEDURE — 250N000009 HC RX 250: Performed by: OBSTETRICS & GYNECOLOGY

## 2025-07-19 PROCEDURE — 722N000001 HC LABOR CARE VAGINAL DELIVERY SINGLE

## 2025-07-19 PROCEDURE — 250N000013 HC RX MED GY IP 250 OP 250 PS 637: Performed by: OBSTETRICS & GYNECOLOGY

## 2025-07-19 PROCEDURE — 59400 OBSTETRICAL CARE: CPT | Performed by: OBSTETRICS & GYNECOLOGY

## 2025-07-19 PROCEDURE — 120N000001 HC R&B MED SURG/OB

## 2025-07-19 PROCEDURE — 250N000011 HC RX IP 250 OP 636: Performed by: NURSE ANESTHETIST, CERTIFIED REGISTERED

## 2025-07-19 PROCEDURE — 10907ZC DRAINAGE OF AMNIOTIC FLUID, THERAPEUTIC FROM PRODUCTS OF CONCEPTION, VIA NATURAL OR ARTIFICIAL OPENING: ICD-10-PCS | Performed by: OBSTETRICS & GYNECOLOGY

## 2025-07-19 PROCEDURE — 0KQM0ZZ REPAIR PERINEUM MUSCLE, OPEN APPROACH: ICD-10-PCS | Performed by: OBSTETRICS & GYNECOLOGY

## 2025-07-19 PROCEDURE — 0UQMXZZ REPAIR VULVA, EXTERNAL APPROACH: ICD-10-PCS | Performed by: OBSTETRICS & GYNECOLOGY

## 2025-07-19 RX ORDER — LOPERAMIDE HYDROCHLORIDE 2 MG/1
4 CAPSULE ORAL
Status: DISCONTINUED | OUTPATIENT
Start: 2025-07-19 | End: 2025-07-21 | Stop reason: HOSPADM

## 2025-07-19 RX ORDER — METHYLERGONOVINE MALEATE 0.2 MG/ML
200 INJECTION INTRAVENOUS
Status: DISCONTINUED | OUTPATIENT
Start: 2025-07-19 | End: 2025-07-21 | Stop reason: HOSPADM

## 2025-07-19 RX ORDER — IBUPROFEN 800 MG/1
800 TABLET, FILM COATED ORAL EVERY 6 HOURS PRN
Status: DISCONTINUED | OUTPATIENT
Start: 2025-07-19 | End: 2025-07-21 | Stop reason: HOSPADM

## 2025-07-19 RX ORDER — LOPERAMIDE HYDROCHLORIDE 2 MG/1
2 CAPSULE ORAL
Status: DISCONTINUED | OUTPATIENT
Start: 2025-07-19 | End: 2025-07-21 | Stop reason: HOSPADM

## 2025-07-19 RX ORDER — HYDROCORTISONE 25 MG/G
CREAM TOPICAL 3 TIMES DAILY PRN
Status: DISCONTINUED | OUTPATIENT
Start: 2025-07-19 | End: 2025-07-21 | Stop reason: HOSPADM

## 2025-07-19 RX ORDER — CARBOPROST TROMETHAMINE 250 UG/ML
250 INJECTION, SOLUTION INTRAMUSCULAR
Status: DISCONTINUED | OUTPATIENT
Start: 2025-07-19 | End: 2025-07-21 | Stop reason: HOSPADM

## 2025-07-19 RX ORDER — ACETAMINOPHEN 325 MG/1
650 TABLET ORAL EVERY 4 HOURS PRN
Status: DISCONTINUED | OUTPATIENT
Start: 2025-07-19 | End: 2025-07-21 | Stop reason: HOSPADM

## 2025-07-19 RX ORDER — BISACODYL 10 MG
10 SUPPOSITORY, RECTAL RECTAL DAILY PRN
Status: DISCONTINUED | OUTPATIENT
Start: 2025-07-19 | End: 2025-07-21 | Stop reason: HOSPADM

## 2025-07-19 RX ORDER — OXYTOCIN/0.9 % SODIUM CHLORIDE 30/500 ML
340 PLASTIC BAG, INJECTION (ML) INTRAVENOUS CONTINUOUS PRN
Status: DISCONTINUED | OUTPATIENT
Start: 2025-07-19 | End: 2025-07-21 | Stop reason: HOSPADM

## 2025-07-19 RX ORDER — AMOXICILLIN 250 MG
2 CAPSULE ORAL
Status: DISCONTINUED | OUTPATIENT
Start: 2025-07-19 | End: 2025-07-21 | Stop reason: HOSPADM

## 2025-07-19 RX ORDER — MISOPROSTOL 200 UG/1
400 TABLET ORAL
Status: DISCONTINUED | OUTPATIENT
Start: 2025-07-19 | End: 2025-07-21 | Stop reason: HOSPADM

## 2025-07-19 RX ORDER — OXYTOCIN/0.9 % SODIUM CHLORIDE 30/500 ML
1-24 PLASTIC BAG, INJECTION (ML) INTRAVENOUS CONTINUOUS
Status: DISCONTINUED | OUTPATIENT
Start: 2025-07-19 | End: 2025-07-19 | Stop reason: HOSPADM

## 2025-07-19 RX ORDER — TRANEXAMIC ACID 10 MG/ML
1 INJECTION, SOLUTION INTRAVENOUS EVERY 30 MIN PRN
Status: DISCONTINUED | OUTPATIENT
Start: 2025-07-19 | End: 2025-07-21 | Stop reason: HOSPADM

## 2025-07-19 RX ORDER — LIDOCAINE 40 MG/G
CREAM TOPICAL
Status: DISCONTINUED | OUTPATIENT
Start: 2025-07-19 | End: 2025-07-19 | Stop reason: HOSPADM

## 2025-07-19 RX ORDER — SODIUM CHLORIDE, SODIUM LACTATE, POTASSIUM CHLORIDE, CALCIUM CHLORIDE 600; 310; 30; 20 MG/100ML; MG/100ML; MG/100ML; MG/100ML
INJECTION, SOLUTION INTRAVENOUS CONTINUOUS PRN
Status: DISCONTINUED | OUTPATIENT
Start: 2025-07-19 | End: 2025-07-19 | Stop reason: HOSPADM

## 2025-07-19 RX ORDER — OXYTOCIN 10 [USP'U]/ML
10 INJECTION, SOLUTION INTRAMUSCULAR; INTRAVENOUS
Status: DISCONTINUED | OUTPATIENT
Start: 2025-07-19 | End: 2025-07-21 | Stop reason: HOSPADM

## 2025-07-19 RX ORDER — POLYETHYLENE GLYCOL 3350 17 G/17G
17 POWDER, FOR SOLUTION ORAL DAILY PRN
Status: DISCONTINUED | OUTPATIENT
Start: 2025-07-19 | End: 2025-07-21 | Stop reason: HOSPADM

## 2025-07-19 RX ADMIN — Medication 2 MILLI-UNITS/MIN: at 07:02

## 2025-07-19 RX ADMIN — SODIUM CHLORIDE, POTASSIUM CHLORIDE, SODIUM LACTATE AND CALCIUM CHLORIDE 100 ML/HR: 600; 310; 30; 20 INJECTION, SOLUTION INTRAVENOUS at 15:18

## 2025-07-19 RX ADMIN — Medication: at 05:00

## 2025-07-19 RX ADMIN — Medication: at 10:22

## 2025-07-19 RX ADMIN — FAMOTIDINE 20 MG: 20 TABLET, FILM COATED ORAL at 20:30

## 2025-07-19 RX ADMIN — BENZOCAINE AND LEVOMENTHOL: 200; 5 SPRAY TOPICAL at 18:26

## 2025-07-19 RX ADMIN — IBUPROFEN 800 MG: 800 TABLET ORAL at 14:36

## 2025-07-19 RX ADMIN — METHYLCELLULOSE 1000 MG: 500 TABLET ORAL at 17:54

## 2025-07-19 RX ADMIN — FAMOTIDINE 20 MG: 20 TABLET, FILM COATED ORAL at 08:10

## 2025-07-19 RX ADMIN — IBUPROFEN 800 MG: 800 TABLET ORAL at 20:30

## 2025-07-19 ASSESSMENT — ACTIVITIES OF DAILY LIVING (ADL)
ADLS_ACUITY_SCORE: 17
ADLS_ACUITY_SCORE: 16
ADLS_ACUITY_SCORE: 16
ADLS_ACUITY_SCORE: 26
ADLS_ACUITY_SCORE: 26
ADLS_ACUITY_SCORE: 17
ADLS_ACUITY_SCORE: 26
ADLS_ACUITY_SCORE: 26
ADLS_ACUITY_SCORE: 17
ADLS_ACUITY_SCORE: 17
ADLS_ACUITY_SCORE: 16
ADLS_ACUITY_SCORE: 27
ADLS_ACUITY_SCORE: 16
ADLS_ACUITY_SCORE: 21
ADLS_ACUITY_SCORE: 16
ADLS_ACUITY_SCORE: 17
ADLS_ACUITY_SCORE: 17
ADLS_ACUITY_SCORE: 16
ADLS_ACUITY_SCORE: 16
ADLS_ACUITY_SCORE: 26
ADLS_ACUITY_SCORE: 27

## 2025-07-19 NOTE — PROGRESS NOTES
[facility]  Labor Progress Note    S: Doing well. Denies feeling pressure.    O:   Patient Vitals for the past 4 hrs:   BP Temp Temp src Pulse Resp SpO2   25 1208 114/58 -- Oral 77 16 95 %   25 1105 119/73 98  F (36.7  C) Oral 83 16 97 %   25 1008 128/68 98.2  F (36.8  C) Oral 86 -- 96 %   25 0905 119/79 98  F (36.7  C) Oral 86 -- 99 %   25 0900 -- -- -- -- -- 97 %   25 0855 -- -- -- -- -- 98 %   25 0850 -- -- -- -- -- 100 %   25 0845 -- -- -- -- -- 100 %   25 0840 -- -- -- -- -- 100 %     General: appearing comfortable between contractions  Resp: breathing comfortably on room air  SVE: 10/100/0    FHT: Baseline 140, moderate variability, accelerations absent, no decelerations  [place]: Ctx q 2 minutes    A/P:  Ms. Karen Garcia is a 40 year old  at 41w1d by LMP c/w 6w4d US here for an induction of labor for late term gestation.    # Labor  - Now completely dilated, will start pushing  - S/p cervidil and 2 doses of cytotec. Currently on pitocin, will continue to titrate per protocol  - AROM performed at 0920, clear fluid     # FWB  - Category 1 FHT. Continue EFM and toco   - EFW 9.75 lb    # Gestational hypertension  - BP mostly normal range  - Will continue to monitor BP closely  - prn antihypertensive medications are available      PNC: Rh +, Rubella immune, GBS negative, , Placenta posterior     Lainey Kim MD

## 2025-07-19 NOTE — PROGRESS NOTES
[facility]  Labor Progress Note    S: Is doing ok this morning. Noticing more contraction pain in her back over the last hour. Denies vaginal pressure.     O:   Patient Vitals for the past 4 hrs:   BP Temp Temp src Pulse Resp SpO2   25 119/79 -- -- 86 -- 99 %   25 -- -- -- -- -- 97 %   2555 -- -- -- -- -- 98 %   25 0850 -- -- -- -- -- 100 %   2545 -- -- -- -- -- 100 %   2540 -- -- -- -- -- 100 %   2535 -- -- -- -- -- 98 %   2530 -- -- -- -- -- 100 %   2525 -- -- -- -- -- 100 %   2520 -- -- -- -- -- 98 %   2515 -- -- -- -- -- 97 %   2510 -- -- -- -- -- 98 %   25 0805 126/73 -- -- 80 -- 96 %   2500 -- -- -- -- -- 97 %   25 0755 -- -- -- -- -- 98 %   25 0750 -- -- -- -- -- 98 %   2545 -- -- -- -- -- 97 %   2540 -- -- -- -- -- 97 %   2535 -- -- -- -- -- 97 %   2530 -- -- -- -- -- 98 %   2525 -- -- -- -- -- 96 %   2520 -- -- -- -- -- 99 %   2515 -- -- -- -- -- 98 %   2510 -- -- -- -- -- 98 %   25 0706 115/69 98  F (36.7  C) Oral 97 16 97 %   25 0700 -- -- -- -- -- 97 %   25 0655 -- -- -- -- -- 97 %   25 0650 -- -- -- -- -- 98 %   2545 -- -- -- -- -- 97 %   2540 -- -- -- -- -- 97 %   2535 -- -- -- -- -- 98 %   2530 -- -- -- -- -- 97 %   2525 -- -- -- -- -- 99 %   2520 -- -- -- -- -- 99 %   25 0615 136/77 -- -- -- -- 100 %   25 0610 -- -- -- -- -- 99 %   25 0605 -- -- -- -- -- 98 %   2500 -- -- -- -- -- 96 %     General: appearing comfortable between contractions  Resp: breathing comfortably on room air  SVE: /-2    FHT: Baseline 135, moderate variability, accelerations present, no decelerations  [place]: Ctx q 2-4 minutes    A/P:  Ms. Karen Garcia is a 40 year old  at 41w1d by LMP c/w 6w4d US here  for an induction of labor for late term gestation.    # Labor  - S/p cervidil and 2 doses of cytotec. Pitocin started at 0702, currently at 10 mu/min. Will continue to titrate per protocol  - AROM performed at 0920, clear fluid  - Discussed using her bolus button with her epidural  - Discussed that this is starting to be an abnormal labor curve. Hasn't made any cervical change in 3 hours. Hopefully AROM and increasing her pitocin will help with cervical change. Anticipate      # FWB  - Category 1 FHT. Continue EFM and toco   - EFW 9.75 lb    # Gestational hypertension  - BP mostly normal range over night  - Will continue to monitor BP closely  - prn antihypertensive medications are available      PNC: Rh +, Rubella immune, GBS negative, , Placenta posterior     Lainey Kim MD

## 2025-07-19 NOTE — PLAN OF CARE
Face to face report given with opportunity to observe patient.    Report given to CAYETANO Crawley RN   7/19/2025  7:18 AM

## 2025-07-19 NOTE — L&D DELIVERY NOTE
OB Vaginal Delivery Note    Karen Garcai MRN# [id number]   Age: 40 year old YOB: 1985       GA: 41w1d  GP:   Labor Complications: Hemorrhage due to vaginal lacerations and bleeding during placental separation   EBL: 1000 mL  Delivery QBL:  pending  Delivery Type: Vaginal, Spontaneous  ROM to Delivery Time: (Delivered) Hours: 4 Minutes: 13   Weight:     1 Minute 5 Minute 10 Minute   Apgar Totals: 7   9           Delivery Details:  Karen Garcia, a 40 year old now  female was admitted at 40w6d for a scheduled induction of labor.  She received cervidil overnight followed by 2 doses of cytotec during the day and started laboring spontaneously overnight. She received an epidural for pain control and pitocin was started for labor augmentation. AROM was performed and she became completely dilated. After pushing for approximately an hour, she delivered a viable male fetus in CHAPARRITA position. There was a loose nuchal cord was was delivered through and reduced afterwards.  The anterior (right) shoulder delivered with gentle downward traction. After over a minute, the cord was doubly clamped and cut and a segment of cord was collected in case needed for cord blood gasses. Prophylactic pitocin was started.  She started have gushes of bleeding before the placenta was fully detached.  The placenta did deliver with gentle traction and appeared intact with a 3VC. Inspection showed a midline periurethral laceration that extended almost all the way to the urethra and a second degree perineal laceration.  A catheter was placed to better identify the location of the urethra. There was brisk bleeding coming from both laceration sites. The periurethral laceration and second degree perineal laceration were repaired in the usual fashion with 3-0 vicryl. Excellent hemostasis was noted. Needle count correct. Infant and patient in delivery room in good and stable condition.     Patient's uterus had  been firm the entire time after delivery of the placenta and atony was not felt to be the cause of her excess bleeding. Excess bleeding attributed to her lacerations and bleeding prior to delivery of the placenta.       Jigar Garcia [[id number]]      Labor Event Times      Dilation complete date: 25 Complete time: 12:27 PM          Rupture date/time: 25 09:20   Rupture type: Artificial Rupture of Membranes  Fluid color: Clear  Fluid odor: Normal     Induction: Cervidil, Misoprostol  Induction date/time:      Cervical ripening date/time:      Indications for induction: Advanced Maternal Age, Gestational Hypertension, Post-term Gestation     Augmentation: Oxytocin, AROM  Indications for augmentation: Ineffective Contraction Pattern       Delivery/Placenta Date and Time      Delivery Date: 25 Delivery Time:  1:33 PM                 Vaginal Counts       Initial count performed by 2 team members:  Two Team Members   Griselda Norman RN         Needles Suture Needles Sponges (RETIRED) Instruments   Initial counts 2  15    Added to count       Relief counts       Final counts               Placed during labor Accounted for at the end of labor   FSE     IUPC     Cervidil                               Apgars       1 Minute 5 Minute 10 Minute 15 Minute 20 Minute   Skin color: 0  1       Heart rate: 2  2       Reflex irritability: 2  2       Muscle tone: 2  2       Respiratory effort: 1  2       Total: 7  9       Apgars assigned by: SURY RN       Cord      Cord Complications: Nuchal   Nuchal Intervention: delivered through         Nuchal cord description: loose nuchal cord         Gases Sent?: No    Delayed cord clamping?: Yes    Cord Clamping Delay (seconds):  seconds    Stem cell collection?: No           [place] Resuscitation    Methods: None  [place] Care at Delivery:  of viable male with Dr. Kim in attendance.  placed on mother's abdomen, dried and stimulated with  warm blankets. Bulb suctioned and vigourus cry at with stimulation at one minute of life. Hat applied.  pinking up. HR always greater than 100.        Skin to Skin and Feeding Plan      Skin to skin initiation date/time: 1841    Skin to skin with: Mother  Skin to skin end date/time:            Labor Events and Shoulder Dystocia    Fetal Tracing Prior to Delivery: Category 2  Shoulder dystocia present?: Neg       Delivery (Maternal) (Provider to Complete) ([id number])    Episiotomy: None  Perineal lacerations: 2nd Repaired?: Yes      Repaired?: Yes   Repair suture: 3-0 Vicryl  Number of repair packets: 3  Genital tract inspection done: Pos       Blood Loss  Mother: Beryl Garcia #[id number]     Start of Mother's Information      Delivery Blood Loss   Intrapartum & Postpartum: 25 0133 - 25 1406    Delivery Admission: 25 1913 - 25 1406         Intrapartum & Postpartum Delivery Admission    None                  End of Mother's Information  Mother: Beryl Garcia #[id number]                Delivery - Provider to Complete ([id number])    Delivery Type (Choose the 1 that will go to the Birth History): Vaginal, Spontaneous                                           Placenta    Removal: Spontaneous  Disposition: Hospital disposal             Anesthesia    Method: Epidural  Cervical dilation at placement: 4-7                    Presentation and Position    Presentation: Vertex    Position: Left Occiput Anterior                     Lainey Kim MD

## 2025-07-19 NOTE — PLAN OF CARE
Spoke with Dr. Kim, updated her on SVE progression. Told her how william patch fel off, MD okay with some IA while pt is in tub. Updated reese RN.

## 2025-07-19 NOTE — PLAN OF CARE
Goal Outcome Evaluation:      Plan of Care Reviewed With: patient    Overall Patient Progress: improvingOverall Patient Progress: improving    Outcome Evaluation: pt improving per plan of care

## 2025-07-19 NOTE — PLAN OF CARE
Labor Shift Note  Data: See Flowsheets activity for contraction and fetal documentation.   Vitals:    25 2200 25 2205 25 2210 25 2245   BP: 105/57 119/60 119/55 108/60   BP Location:    Left arm   Patient Position:    Semi-Dominguez's   Cuff Size:    Adult Regular   Pulse:    72   Resp:    16   Temp:    98.1  F (36.7  C)   TempSrc:    Oral   SpO2: 96% 95%  98%   Weight:       Height:       . Signs and symptoms of infection Absent.    Complications in labor: Absent. Support person Fito present.  Interventions: Continue uterine/fetal assessment per orders and nursing discretion. Vital Signs per order set. Comfort measures/pain control/labor management: Frequent position changes to facilitate labor with epidural anesthesia.  Plan: Anticipate . Provide labor/coping assistance as needed by patient and support person.  Observe for and notify care provider of indications of progressing labor, need for pain medications, or signs of fetal/maternal compromise.

## 2025-07-19 NOTE — ANESTHESIA CARE TRANSFER NOTE
Patient: Karen Garcia    Procedure: * No procedures listed *       Diagnosis: * No pre-op diagnosis entered *  Diagnosis Additional Information: No value filed.    Anesthesia Type:   Epidural     Note:    Oropharynx: spontaneously breathing  Level of Consciousness: awake  Oxygen Supplementation: room air    Independent Airway: airway patency satisfactory and stable  Dentition: dentition unchanged  Vital Signs Stable: post-procedure vital signs reviewed and stable    Patient transferred to: Labor and Delivery    Handoff Report: Identifed the Patient, Identified the Reponsible Provider, Reviewed the pertinent medical history, Discussed the surgical course, Reviewed Intra-OP anesthesia mangement and issues during anesthesia, Set expectations for post-procedure period and Allowed opportunity for questions and acknowledgement of understanding  Vitals:  Vitals Value Taken Time   /78 07/18/25 21:21   Temp     Pulse     Resp     SpO2 100 % 07/18/25 21:22       Electronically Signed By: VITOR Nichole CRNA  July 18, 2025  9:25 PM

## 2025-07-19 NOTE — ANESTHESIA PREPROCEDURE EVALUATION
Anesthesia Pre-Procedure Evaluation    Patient: Karen Garcia   MRN: [id number] : 1985          Procedure :          History reviewed. No pertinent past medical history.   Past Surgical History:   Procedure Laterality Date    DILATION AND CURETTAGE SUCTION N/A 2023    Procedure: DILATION AND CURETTAGE, UTERUS, USING SUCTION;  Surgeon: Jeevan Oconnell MD;  Location: [facility]    EXCISE LESION HEAD N/A 5/10/2021    Procedure: Excision of Scalp Lesion;  Surgeon: Erik Bahena MD;  Location: [facility] OR      No Known Allergies   Social History     Tobacco Use    Smoking status: Never    Smokeless tobacco: Never   Substance Use Topics    Alcohol use: Yes     Comment: occasionally      Wt Readings from Last 1 Encounters:   25 115.2 kg (254 lb)        Anesthesia Evaluation   Pt has not had prior anesthetic     No history of anesthetic complications       ROS/MED HX  ENT/Pulmonary:  - neg pulmonary ROS     Neurologic:  - neg neurologic ROS     Cardiovascular:  - neg cardiovascular ROS     METS/Exercise Tolerance:     Hematologic:  - neg hematologic  ROS     Musculoskeletal:       GI/Hepatic:  - neg GI/hepatic ROS     Renal/Genitourinary:       Endo:  - neg endo ROS     Psychiatric/Substance Use:  - neg psychiatric ROS     Infectious Disease:       Malignancy:       Other:              Physical Exam  Airway  Mallampati: II  TM distance: > 3 FB  Neck ROM: full    Cardiovascular - normal exam   Dental - normal exam    Pulmonary - normal exam      Neurological   Other Findings       OUTSIDE LABS:  CBC:   Lab Results   Component Value Date    WBC 13.3 (H) 2025    WBC 13.3 (H) 2025    HGB 13.2 2025    HGB 12.5 2025    HCT 37.8 2025    HCT 36.0 2025     2025     2025     BMP:   Lab Results   Component Value Date     2025     10/09/2023    POTASSIUM 3.4 2025    POTASSIUM 3.9 10/09/2023    CHLORIDE 104 2025    CHLORIDE 102  "10/09/2023    CO2 21 (L) 05/27/2025    CO2 24 10/09/2023    BUN 5.0 (L) 05/27/2025    BUN 13.0 10/09/2023    CR 0.75 07/17/2025    CR 0.67 05/27/2025     (H) 05/27/2025    GLC 85 10/09/2023     COAGS: No results found for: \"PTT\", \"INR\", \"FIBR\"  POC:   Lab Results   Component Value Date    HCG Negative 08/15/2024     HEPATIC:   Lab Results   Component Value Date    ALBUMIN 3.3 (L) 05/27/2025    PROTTOTAL 6.2 (L) 05/27/2025    ALT 17 07/17/2025    AST 21 07/17/2025    ALKPHOS 94 05/27/2025    BILITOTAL 0.2 05/27/2025     OTHER:   Lab Results   Component Value Date    DANIEL 8.7 (L) 05/27/2025    TSH 2.20 05/27/2025       Anesthesia Plan    ASA Status:  2       Anesthesia Type: Epidural.        Consents    Anesthesia Plan(s) and associated risks, benefits, and realistic alternatives discussed. Questions answered and patient/representative(s) expressed understanding.     - Discussed:     - Discussed with:  Patient               Postoperative Care         Comments:               neg OB ROS.      VITOR Nichole CRNA    I have reviewed the pertinent notes and labs in the chart from the past 30 days and (re)examined the patient.  Any updates or changes from those notes are reflected in this note.    Clinically Significant Risk Factors Present on Admission                                              "

## 2025-07-19 NOTE — PROGRESS NOTES
[facility]  Labor Progress Note    S: Patient has become significantly more uncomfortable.  Fiorella spontaneously now, having to stop and breath through contractions. Requesting epidural.     O:   Patient Vitals for the past 4 hrs:   BP Temp Temp src Pulse Resp SpO2   25 (!) 145/72 97.5  F (36.4  C) Oral 82 18 99 %     General: appearing comfortable between contractions  Resp: breathing comfortably on room air  SVE: 4/60/-3 per RN    FHT: Baseline 140, moderate variability, accelerations present, no decelerations  [place]: Ctx q 2-4 minutes    A/P:  Ms. Karen Garcia is a 40 year old  at 41w0d by LMP c/w 6w4d US here for an induction of labor for late term gestation.    # Labor  - S/p cervidil and 2 doses of cytotec. Is now laboring spontaneously and making cervical change.  - Desires epidural for analgesia   - Anticipate      # FWB  - Category 1 FHT. Continue EFM and toco   - EFW 9.75 lb    # Gestational hypertension  - Now meets criteria for gestational hypertension   - Will continue to monitor BP closely  - prn antihypertensive medications are available      PNC: Rh +, Rubella immune, GBS negative, , Placenta posterior     Lainey Kim MD

## 2025-07-19 NOTE — PLAN OF CARE
Face to face report given with opportunity to observe patient.    Report given to CAYETANO Seymour RN   7/18/2025  7:16 PM

## 2025-07-19 NOTE — PROGRESS NOTES
[facility]  Labor Progress Note    S: Feeling more comfortable. Denies feeling pressure.    O:   Patient Vitals for the past 4 hrs:   BP Pulse SpO2   25 1008 128/68 86 96 %   25 0905 119/79 86 99 %   25 0900 -- -- 97 %   25 0855 -- -- 98 %   25 0850 -- -- 100 %   25 0845 -- -- 100 %   25 0840 -- -- 100 %   25 0835 -- -- 98 %   25 0830 -- -- 100 %   25 0825 -- -- 100 %   25 0820 -- -- 98 %   25 0815 -- -- 97 %   25 0810 -- -- 98 %   25 0805 126/73 80 96 %   25 0800 -- -- 97 %   25 0755 -- -- 98 %   25 0750 -- -- 98 %   25 0745 -- -- 97 %   25 0740 -- -- 97 %   25 0735 -- -- 97 %   25 0730 -- -- 98 %   25 0725 -- -- 96 %   25 0720 -- -- 99 %   25 0715 -- -- 98 %     General: appearing comfortable between contractions  Resp: breathing comfortably on room air  SVE: 8.5/90/-2    FHT: Baseline 135, moderate variability with period of minimal variability, accelerations present, no decelerations  [place]: Ctx q 2.5-3 minutes    A/P:  Ms. Karen Garcia is a 40 year old  at 41w1d by LMP c/w 6w4d US here for an induction of labor for late term gestation.    # Labor  - S/p cervidil and 2 doses of cytotec. Pitocin started at 0702, currently at 14 mu/min. Will continue to titrate per protocol  - AROM performed at 0920, clear fluid  - No cervical change since last exam, suspect that she was having inadequate contractions but contractions are getting closer together now.  Was initially called 8.5 cm at 0620, will reassess around 1230     # FWB  - Category 1 FHT. Continue EFM and toco   - EFW 9.75 lb    # Gestational hypertension  - BP mostly normal range  - Will continue to monitor BP closely  - prn antihypertensive medications are available      PNC: Rh +, Rubella immune, GBS negative, , Placenta posterior     Lainey Kim MD

## 2025-07-19 NOTE — ANESTHESIA PROCEDURE NOTES
"Epidural catheter Procedure Note    Pre-Procedure   Staff -        CRNA: Adolfo Levy APRN CRNA       Performed By: CRNA       Location: OB       Procedure Start/Stop Times: 7/18/2025 9:00 PM and 7/18/2025 9:15 PM       Pre-Anesthestic Checklist: patient identified, IV checked, risks and benefits discussed, informed consent, monitors and equipment checked, pre-op evaluation and at physician/surgeon's request  Timeout:       Correct Patient: Yes        Correct Procedure: Yes        Correct Site: Yes        Correct Position: Yes   Procedure Documentation  Procedure: epidural catheter         Diagnosis: labor pain       Patient Position: sitting       Skin prep: Betadine       Local skin infiltrated with 3 mL of 1% lidocaine.        Insertion Site: L3-4. (midline approach).       Technique: LORT saline        CADE at 8 cm.       Needle Type: RelayRidesy needle       Needle Gauge: 18.        Needle Length (Inches): 3.5        Catheter: 20 G.          Catheter threaded easily.         13 cm epidural space.         Threaded 5 cm at skin.         # of attempts: 1 and  # of redirects:  0    Assessment/Narrative         Paresthesias: No.       Test dose of 3 mL lidocaine 1.5% w/ 1:200,000 epinephrine at 21:05 CDT.         Test dose negative, 3 minutes after injection, for signs of intravascular, subdural, or intrathecal injection.       Insertion/Infusion Method: LORT saline       Aspiration negative for Heme or CSF via Epidural Catheter.    Medication(s) Administered   Medication Administration Time: 7/18/2025 9:00 PM     Comments:  Exp 3-31-25  Lot [id number]        FOR [facility] [place] ONLY:   Pain Team Contact information: please page the Pain Team Via [employer]. Search \"Pain\". During daytime hours, please page the attending first. At night please page the resident first.      "

## 2025-07-19 NOTE — PLAN OF CARE
Got pt up and unable to void, did get a straight cath put inn during repairs, so will re attempt in about an hour. Fundal check WDL.

## 2025-07-19 NOTE — PLAN OF CARE
"/62 (BP Location: Left arm, Patient Position: Semi-Dominguez's, Cuff Size: Adult Regular)   Pulse 86   Temp 98  F (36.7  C) (Oral)   Resp 16   Ht 1.854 m (6' 1\")   Wt 115.2 kg (254 lb)   LMP 10/04/2024 (Exact Date)   SpO2 98%   BMI 33.51 kg/m      Data: Vital signs within normal limits. Postpartum checks within normal limits - see flow record. Patient eating and drinking normally - still awaiting trying a big meal, has just has snacks and fluids. Awaiting voids postpartum. No apparent signs of infection. Lacerations swollen, ice pack on, tucks pads in room. Patient performing self cares and is able to care for infant.  Action: Patient medicated during the shift for pain. See MAR. Patient reassessed within 1 hour after each medication and pain was improved - patient stated she was comfortable. Patient education done  - see flow record.  Response: Positive attachment behaviors observed with infant. Support persons,  Fito, present.   Plan: Encouraged to call with any questions or concerns. Continued planning for discharge.    Goal Outcome Evaluation:      Plan of Care Reviewed With: patient    Overall Patient Progress: improvingOverall Patient Progress: improving    Outcome Evaluation: Progressing per plan of care.          "

## 2025-07-19 NOTE — PLAN OF CARE
"/69 (BP Location: Left arm, Patient Position: Semi-Dominguez's, Cuff Size: Adult Regular)   Pulse 97   Temp 98  F (36.7  C) (Oral)   Resp 16   Ht 1.854 m (6' 1\")   Wt 115.2 kg (254 lb)   LMP 10/04/2024 (Exact Date)   SpO2 97%   BMI 33.51 kg/m      Pitocin started this AM, will up as appropriate - pt comfortable with epidural. See flowsheets for full VS and assessments. LS clear HR regular. Spoke with pt about how pushing may look and the options for it later today, along with who is typically in the room during this and their roles. Anticipate . No signs of infection present, no complications currently present.  Encouraged pt to call with any questions or concerns, including pressure, her water breaking, or feeling as though she wants to push. Call light within reach.     Goal Outcome Evaluation:      Plan of Care Reviewed With: patient    Overall Patient Progress: improvingOverall Patient Progress: improving    Outcome Evaluation: Progressing per plan of care.          "

## 2025-07-20 LAB
ALBUMIN SERPL BCG-MCNC: 2.7 G/DL (ref 3.5–5.2)
ALP SERPL-CCNC: 108 U/L (ref 40–150)
ALT SERPL W P-5'-P-CCNC: 14 U/L (ref 0–50)
ANION GAP SERPL CALCULATED.3IONS-SCNC: 11 MMOL/L (ref 7–15)
AST SERPL W P-5'-P-CCNC: 22 U/L (ref 0–45)
BILIRUB SERPL-MCNC: 0.4 MG/DL
BUN SERPL-MCNC: 11.7 MG/DL (ref 6–20)
CALCIUM SERPL-MCNC: 8.1 MG/DL (ref 8.8–10.4)
CHLORIDE SERPL-SCNC: 107 MMOL/L (ref 98–107)
CREAT SERPL-MCNC: 0.81 MG/DL (ref 0.51–0.95)
EGFRCR SERPLBLD CKD-EPI 2021: >90 ML/MIN/1.73M2
ERYTHROCYTE [DISTWIDTH] IN BLOOD BY AUTOMATED COUNT: 13.8 % (ref 10–15)
GLUCOSE SERPL-MCNC: 85 MG/DL (ref 70–99)
HCO3 SERPL-SCNC: 19 MMOL/L (ref 22–29)
HCT VFR BLD AUTO: 28.1 % (ref 35–47)
HGB BLD-MCNC: 9.9 G/DL (ref 11.7–15.7)
MCH RBC QN AUTO: 32.2 PG (ref 26.5–33)
MCHC RBC AUTO-ENTMCNC: 35.2 G/DL (ref 31.5–36.5)
MCV RBC AUTO: 92 FL (ref 78–100)
PLATELET # BLD AUTO: 193 10E3/UL (ref 150–450)
POTASSIUM SERPL-SCNC: 3.6 MMOL/L (ref 3.4–5.3)
PROT SERPL-MCNC: 4.4 G/DL (ref 6.4–8.3)
RBC # BLD AUTO: 3.07 10E6/UL (ref 3.8–5.2)
SODIUM SERPL-SCNC: 137 MMOL/L (ref 135–145)
WBC # BLD AUTO: 19.7 10E3/UL (ref 4–11)

## 2025-07-20 PROCEDURE — 250N000011 HC RX IP 250 OP 636: Mod: JZ | Performed by: OBSTETRICS & GYNECOLOGY

## 2025-07-20 PROCEDURE — 258N000003 HC RX IP 258 OP 636: Performed by: OBSTETRICS & GYNECOLOGY

## 2025-07-20 PROCEDURE — 250N000013 HC RX MED GY IP 250 OP 250 PS 637: Performed by: OBSTETRICS & GYNECOLOGY

## 2025-07-20 PROCEDURE — 36415 COLL VENOUS BLD VENIPUNCTURE: CPT | Performed by: OBSTETRICS & GYNECOLOGY

## 2025-07-20 PROCEDURE — 82310 ASSAY OF CALCIUM: CPT | Performed by: OBSTETRICS & GYNECOLOGY

## 2025-07-20 PROCEDURE — 82947 ASSAY GLUCOSE BLOOD QUANT: CPT | Performed by: OBSTETRICS & GYNECOLOGY

## 2025-07-20 PROCEDURE — 85027 COMPLETE CBC AUTOMATED: CPT | Performed by: OBSTETRICS & GYNECOLOGY

## 2025-07-20 PROCEDURE — 120N000001 HC R&B MED SURG/OB

## 2025-07-20 RX ORDER — DIPHENHYDRAMINE HYDROCHLORIDE 50 MG/ML
50 INJECTION, SOLUTION INTRAMUSCULAR; INTRAVENOUS
Status: DISCONTINUED | OUTPATIENT
Start: 2025-07-20 | End: 2025-07-21 | Stop reason: HOSPADM

## 2025-07-20 RX ORDER — OXYCODONE HYDROCHLORIDE 5 MG/1
5 TABLET ORAL EVERY 4 HOURS PRN
Refills: 0 | Status: DISCONTINUED | OUTPATIENT
Start: 2025-07-20 | End: 2025-07-21 | Stop reason: HOSPADM

## 2025-07-20 RX ORDER — ALBUTEROL SULFATE 0.83 MG/ML
2.5 SOLUTION RESPIRATORY (INHALATION)
Status: DISCONTINUED | OUTPATIENT
Start: 2025-07-20 | End: 2025-07-21 | Stop reason: HOSPADM

## 2025-07-20 RX ORDER — DIPHENHYDRAMINE HYDROCHLORIDE 50 MG/ML
25 INJECTION, SOLUTION INTRAMUSCULAR; INTRAVENOUS
Status: DISCONTINUED | OUTPATIENT
Start: 2025-07-20 | End: 2025-07-21 | Stop reason: HOSPADM

## 2025-07-20 RX ORDER — ALBUTEROL SULFATE 90 UG/1
1-2 INHALANT RESPIRATORY (INHALATION)
Status: DISCONTINUED | OUTPATIENT
Start: 2025-07-20 | End: 2025-07-21 | Stop reason: HOSPADM

## 2025-07-20 RX ORDER — MEPERIDINE HYDROCHLORIDE 25 MG/ML
25 INJECTION INTRAMUSCULAR; INTRAVENOUS; SUBCUTANEOUS
Refills: 0 | Status: DISCONTINUED | OUTPATIENT
Start: 2025-07-20 | End: 2025-07-21 | Stop reason: HOSPADM

## 2025-07-20 RX ORDER — METHYLPREDNISOLONE SODIUM SUCCINATE 40 MG/ML
40 INJECTION INTRAMUSCULAR; INTRAVENOUS
Status: DISCONTINUED | OUTPATIENT
Start: 2025-07-20 | End: 2025-07-21 | Stop reason: HOSPADM

## 2025-07-20 RX ADMIN — IBUPROFEN 800 MG: 800 TABLET ORAL at 08:44

## 2025-07-20 RX ADMIN — OXYCODONE HYDROCHLORIDE 5 MG: 5 TABLET ORAL at 20:30

## 2025-07-20 RX ADMIN — FAMOTIDINE 20 MG: 20 TABLET, FILM COATED ORAL at 08:44

## 2025-07-20 RX ADMIN — IBUPROFEN 800 MG: 800 TABLET ORAL at 15:59

## 2025-07-20 RX ADMIN — ACETAMINOPHEN 650 MG: 325 TABLET ORAL at 15:59

## 2025-07-20 RX ADMIN — SODIUM CHLORIDE, POTASSIUM CHLORIDE, SODIUM LACTATE AND CALCIUM CHLORIDE 100 ML/HR: 600; 310; 30; 20 INJECTION, SOLUTION INTRAVENOUS at 01:30

## 2025-07-20 RX ADMIN — IBUPROFEN 800 MG: 800 TABLET ORAL at 02:48

## 2025-07-20 RX ADMIN — ACETAMINOPHEN 650 MG: 325 TABLET ORAL at 20:07

## 2025-07-20 RX ADMIN — SODIUM CHLORIDE 25 MG: 9 INJECTION, SOLUTION INTRAVENOUS at 11:30

## 2025-07-20 RX ADMIN — METHYLCELLULOSE 1000 MG: 500 TABLET ORAL at 08:44

## 2025-07-20 RX ADMIN — IBUPROFEN 800 MG: 800 TABLET ORAL at 22:53

## 2025-07-20 RX ADMIN — FAMOTIDINE 20 MG: 20 TABLET, FILM COATED ORAL at 20:07

## 2025-07-20 RX ADMIN — SODIUM CHLORIDE 975 MG: 9 INJECTION, SOLUTION INTRAVENOUS at 14:00

## 2025-07-20 ASSESSMENT — ACTIVITIES OF DAILY LIVING (ADL)
ADLS_ACUITY_SCORE: 26
ADLS_ACUITY_SCORE: 26
ADLS_ACUITY_SCORE: 25
ADLS_ACUITY_SCORE: 28
ADLS_ACUITY_SCORE: 25
ADLS_ACUITY_SCORE: 26
ADLS_ACUITY_SCORE: 26
ADLS_ACUITY_SCORE: 25
ADLS_ACUITY_SCORE: 25
ADLS_ACUITY_SCORE: 26
ADLS_ACUITY_SCORE: 28
ADLS_ACUITY_SCORE: 25
ADLS_ACUITY_SCORE: 26
ADLS_ACUITY_SCORE: 26
ADLS_ACUITY_SCORE: 25
ADLS_ACUITY_SCORE: 26
ADLS_ACUITY_SCORE: 26
ADLS_ACUITY_SCORE: 25
ADLS_ACUITY_SCORE: 25
ADLS_ACUITY_SCORE: 26
ADLS_ACUITY_SCORE: 25

## 2025-07-20 NOTE — PLAN OF CARE
Spoke with Dr. Kim about issues voiding, positive bladder scan, and troubles getting straight cath due to swelling, repairs. Very uncomfortable for pt. Per Dr. Kim, nazario catheter placed until AM to reattempt voiding with some hopefully less swelling/trauma issues.  Face to face report given with opportunity to observe patient.    Report given to CAYETANO Seymour RN   7/19/2025  7:23 PM

## 2025-07-20 NOTE — PROGRESS NOTES
[facility]  Postpartum Note   Name: Karen Garcia   MRN: [id number]      S: Patient is doing and feeling ok this morning. Gonzales catheter was recently removed, has yet to void. Her bottom feels sore but is tolerable. Pain has been adequately controlled. Tolerating regular diet without nausea or vomiting. Ambulating without dizziness. Lochia = menses. Passing flatus. Breast feeding successfully. Plans discharge home tomorrow.      O:   Patient Vitals for the past 24 hrs:   BP Temp Temp src Pulse Resp SpO2   07/20/25 0300 103/57 97.8  F (36.6  C) Oral 74 16 96 %   07/19/25 2315 115/55 98.1  F (36.7  C) Oral 82 16 96 %   07/19/25 2030 113/58 98.3  F (36.8  C) Oral 82 16 95 %   07/19/25 1600 106/64 -- -- 80 16 98 %   07/19/25 1545 123/68 -- -- -- 16 96 %   07/19/25 1530 123/69 -- -- -- 16 96 %   07/19/25 1515 118/62 98  F (36.7  C) Oral 86 16 98 %   07/19/25 1500 120/62 -- -- -- 16 98 %   07/19/25 1445 118/77 -- -- -- 16 98 %   07/19/25 1430 116/76 -- -- -- 16 98 %   07/19/25 1415 111/68 -- -- -- 16 97 %   07/19/25 1400 120/67 -- -- -- 16 97 %   07/19/25 1345 113/63 97.8  F (36.6  C) Oral 99 16 96 %   07/19/25 1302 128/70 97.5  F (36.4  C) Oral -- 16 98 %   07/19/25 1257 -- -- -- -- -- 99 %   07/19/25 1252 -- -- -- -- -- 98 %   07/19/25 1247 -- -- -- -- -- 98 %   07/19/25 1242 -- -- -- -- -- 97 %   07/19/25 1237 -- -- -- -- -- 96 %   07/19/25 1232 -- -- -- -- -- 96 %   07/19/25 1227 -- -- -- -- -- 95 %   07/19/25 1222 -- -- -- -- -- 94 %   07/19/25 1217 -- -- -- -- -- 95 %   07/19/25 1212 -- -- -- -- -- 97 %   07/19/25 1208 114/58 -- Oral 77 16 95 %   07/19/25 1207 -- -- -- -- -- 95 %   07/19/25 1202 -- -- -- -- -- 97 %   07/19/25 1105 119/73 98  F (36.7  C) Oral 83 16 97 %      Gen: Resting comfortably, NAD   Pulm: no increased work of breathing  Abd: Soft, nony ttp, non-distended. Fundus under umbilicus, firm and non-tender.   Ext: non-tender, trace edema     Hgb:   Hemoglobin   Date Value Ref Range Status    2025 9.9 (L) 11.7 - 15.7 g/dL Final   2021 14.7 11.7 - 15.7 g/dL Final      Assessment/Plan:   40 year old  on PPD #1 s/p     # Gestational hypertension  - BP WNL since delivery, not on antihypertensive medications  - HELLP labs WNL this morning    # Acute blood loss anemia  - Hgb 13.2 > QBL 1210 > 9.9  - Will give dose of IV iron today     PP: continue with routine postpartum management   Pain: tylenol, ibuprofen. Good control   Fen/Gi: Tolerating regular diet   Feed: breast   Rubella:immune   Rh: positive   PPX: Ambulation for DVT ppx, SCDs if in bed for prolonged period of time   Dispo: NM home tomorrow     Lainey Kim MD

## 2025-07-20 NOTE — ANESTHESIA POSTPROCEDURE EVALUATION
Patient: Karen Garcia    Procedure: * No procedures listed *       Anesthesia Type:  Epidural    Note:  Disposition: Inpatient   Postop Pain Control: Uneventful            Sign Out: Well controlled pain   PONV: No   Neuro/Psych: Uneventful            Sign Out: Acceptable/Baseline neuro status   Airway/Respiratory: Uneventful            Sign Out: Acceptable/Baseline resp. status   CV/Hemodynamics: Uneventful            Sign Out: Acceptable CV status; No obvious hypovolemia; No obvious fluid overload   Other NRE: NONE   DID A NON-ROUTINE EVENT OCCUR? No       Last vitals:  Vitals:    07/19/25 2030 07/19/25 2315 07/20/25 0300   BP: 113/58 115/55 103/57   Pulse: 82 82 74   Resp: 16 16 16   Temp: 98.3  F (36.8  C) 98.1  F (36.7  C) 97.8  F (36.6  C)   SpO2: 95% 96% 96%       Electronically Signed By: VITOR Nichole CRNA  July 20, 2025  8:28 AM

## 2025-07-20 NOTE — PLAN OF CARE
Goal Outcome Evaluation:      Plan of Care Reviewed With: patient, spouse    Overall Patient Progress: improvingOverall Patient Progress: improving    Outcome Evaluation: Improving in all areas      Data: Vital signs within normal limits. Postpartum checks within normal limits - see flow record. Patient eating and drinking normally. Patient able to empty bladder independently and is up ambulating. No apparent signs of infection. Tears healing well. Patient performing self cares and is able to care for infant.  Action: Patient medicated during the shift for pain. See MAR. Patient reassessed within 1 hour after each medication and pain was improved - patient stated she was comfortable. Patient education done about Breastfeeding, CPR, 24 hr testing. See flow record.  Response: Positive attachment behaviors observed with infant. Support persons Fito present.   Plan: Anticipate discharge on 7/21/25.

## 2025-07-20 NOTE — PLAN OF CARE
Assessments completed as charted. B/P: 115/55, T: 98.1, P: 82, R: 16. Rates pain: /10 some relief with ibuprofen, tucks pads, ice packs, and dermoplast applied. Gonzales catheter in place overnight per MD for healing after  tears/repairs/swelling. Fundus: Midline Firm at U. Lochia: Light. Activity: as tolerated. Infant feeding: Breast feeding going well.           Postpartum breastfeeding assessment completed and education provided, see Patient Education Activity.  Items included in the education are:   proper positioning and latch  effectiveness of feeding  manual expression  handling and storing breastmilk  maintenance of breastfeeding for the first 6 months  sign/symptoms of infant feeding issues requiring referral to qualified health care provider  Postpartum care education provided, see Patient Education activity. Patient denies needs. Will monitor.  Griselda Elias RN

## 2025-07-21 ENCOUNTER — MYC MEDICAL ADVICE (OUTPATIENT)
Dept: OBGYN | Facility: OTHER | Age: 40
End: 2025-07-21

## 2025-07-21 VITALS
SYSTOLIC BLOOD PRESSURE: 127 MMHG | HEIGHT: 72 IN | WEIGHT: 254 LBS | TEMPERATURE: 97.8 F | DIASTOLIC BLOOD PRESSURE: 58 MMHG | HEART RATE: 75 BPM | RESPIRATION RATE: 16 BRPM | OXYGEN SATURATION: 96 % | BODY MASS INDEX: 34.4 KG/M2

## 2025-07-21 PROBLEM — O13.9 GESTATIONAL HYPERTENSION: Status: ACTIVE | Noted: 2025-07-21

## 2025-07-21 PROCEDURE — 250N000013 HC RX MED GY IP 250 OP 250 PS 637: Performed by: OBSTETRICS & GYNECOLOGY

## 2025-07-21 RX ORDER — TRIAMCINOLONE ACETONIDE 1 MG/G
CREAM TOPICAL 2 TIMES DAILY
Status: DISCONTINUED | OUTPATIENT
Start: 2025-07-21 | End: 2025-07-21 | Stop reason: HOSPADM

## 2025-07-21 RX ORDER — OXYCODONE HYDROCHLORIDE 5 MG/1
5 TABLET ORAL EVERY 4 HOURS PRN
Qty: 6 TABLET | Refills: 0 | Status: SHIPPED | OUTPATIENT
Start: 2025-07-21 | End: 2025-07-22

## 2025-07-21 RX ORDER — OXYCODONE HYDROCHLORIDE 5 MG/1
5 TABLET ORAL EVERY 4 HOURS PRN
Qty: 6 TABLET | Refills: 0 | Status: SHIPPED | OUTPATIENT
Start: 2025-07-21 | End: 2025-07-21

## 2025-07-21 RX ADMIN — FAMOTIDINE 20 MG: 20 TABLET, FILM COATED ORAL at 09:36

## 2025-07-21 RX ADMIN — ACETAMINOPHEN 650 MG: 325 TABLET ORAL at 00:14

## 2025-07-21 RX ADMIN — IBUPROFEN 800 MG: 800 TABLET ORAL at 12:24

## 2025-07-21 RX ADMIN — METHYLCELLULOSE 1000 MG: 500 TABLET ORAL at 09:35

## 2025-07-21 RX ADMIN — OXYCODONE HYDROCHLORIDE 5 MG: 5 TABLET ORAL at 04:07

## 2025-07-21 RX ADMIN — ACETAMINOPHEN 650 MG: 325 TABLET ORAL at 12:24

## 2025-07-21 RX ADMIN — OXYCODONE HYDROCHLORIDE 5 MG: 5 TABLET ORAL at 09:36

## 2025-07-21 RX ADMIN — TRIAMCINOLONE ACETONIDE: 1 CREAM TOPICAL at 12:20

## 2025-07-21 RX ADMIN — OXYCODONE HYDROCHLORIDE 5 MG: 5 TABLET ORAL at 16:05

## 2025-07-21 RX ADMIN — ACETAMINOPHEN 650 MG: 325 TABLET ORAL at 05:32

## 2025-07-21 RX ADMIN — IBUPROFEN 800 MG: 800 TABLET ORAL at 04:07

## 2025-07-21 ASSESSMENT — ACTIVITIES OF DAILY LIVING (ADL)
ADLS_ACUITY_SCORE: 24

## 2025-07-21 NOTE — PROGRESS NOTES
[facility]  Postpartum Note   Name: Karen Garcia   MRN: [id number]      S: Patient is doing and feeling ok this morning. Bottom is still feeling really sore. Oxycodone has helped. Tolerating regular diet without nausea or vomiting. Ambulating without dizziness. Lochia = menses. Passing flatus, voiding spontaneously, no BM yet. Breast feeding successfully. Plans discharge home today.     O:   Patient Vitals for the past 24 hrs:   BP Temp Temp src Pulse Resp SpO2   25 2300 113/55 97.7  F (36.5  C) Oral 73 16 97 %   25 1545 -- -- -- -- -- 96 %   25 1544 107/53 97.8  F (36.6  C) Oral -- 16 --   25 1424 120/67 -- -- -- -- --   25 1205 105/60 97.6  F (36.4  C) Oral -- 18 98 %      Gen: Resting comfortably, NAD   Pulm: no increased work of breathing  Abd: Soft, nony ttp, non-distended. Fundus under umbilicus, firm and non-tender.   Ext: non-tender, trace edema     Hgb:   Hemoglobin   Date Value Ref Range Status   2025 9.9 (L) 11.7 - 15.7 g/dL Final   2021 14.7 11.7 - 15.7 g/dL Final      Assessment/Plan:   40 year old  on PPD #2 s/p     # Gestational hypertension  - BP WNL since delivery, not on antihypertensive medications  - HELLP labs WNL this morning    # Acute blood loss anemia  - Hgb 13.2 > QBL 1210 > 9.9  - S/p IV iron yesterday    PP: continue with routine postpartum management   Pain: oxycodone, tylenol, ibuprofen. Good control   Fen/Gi: Tolerating regular diet   Feed: breast   Rubella:immune   Rh: positive   PPX: Ambulation for DVT ppx, SCDs if in bed for prolonged period of time   Dispo: DC home today     Lainey Kim MD

## 2025-07-21 NOTE — PLAN OF CARE
Face to face report given with opportunity to observe patient.    Report given to CAYETANO Rao RN   7/21/2025  7:05 AM

## 2025-07-21 NOTE — PLAN OF CARE
Assessments completed as charted. B/P: 113/55, T: 97.7, P: 73, R: 16. Rates pain: 5/10 finding some relief with Oxycodone. We are also alternating Tylenol and Ibuprofen and applying ice packs, Tucks pads, and Dermoplast. Voiding without difficulty. Fundus: Midline Firm at U. Lochia: scant. Activity: unrestricted with out pain . Infant feeding: Breast feeding going well.           Postpartum breastfeeding assessment completed and education provided, see Patient Education Activity.  Items included in the education are:   proper positioning and latch  effectiveness of feeding  manual expression  handling and storing breastmilk  maintenance of breastfeeding for the first 6 months  sign/symptoms of infant feeding issues requiring referral to qualified health care provider  Postpartum care education provided, see Patient Education activity. Patient denies needs. Will monitor.  Griselda Elias RN

## 2025-07-21 NOTE — CARE PLAN
Face to face report given with opportunity to observe patient.    Report given to CAYETANO Seymour RN   7/20/2025  7:38 PM

## 2025-07-21 NOTE — DISCHARGE INSTRUCTIONS
Warning Signs after Having a Baby    Keep this paper on your fridge or somewhere else where you can see it.    Call your provider if you have any of these symptoms up to 12 weeks after having your baby.    Thoughts of hurting yourself or your baby  Pain in your chest or trouble breathing  Severe headache not helped by pain medicine  Eyesight concerns (blurry vision, seeing spots or flashes of light, other changes to eyesight)  Fainting, shaking or other signs of a seizure    Call 9-1-1 if you feel that it is an emergency.     The symptoms below can happen to anyone after giving birth. They can be very serious. Call your provider if you have any of these warning signs.      Losing too much blood (hemorrhage)    Call your provider if you soak through a pad in less than an hour or pass blood clots bigger than a golf ball. These may be signs that you are bleeding too much.    Blood clots in the legs or lungs    After you give birth, your body naturally clots its blood to help prevent blood loss. Sometimes this increased clotting can happen in other areas of the body, like the legs or lungs. This can block your blood flow and be very dangerous.     Call your provider if you:  Have a red, swollen spot on the back of your leg that is warm or painful when you touch it.   Are coughing up blood.     Infection    Call your provider if you have any of these symptoms:  Fever of 100.4 F (38 C) or higher.  Pain or redness around your stitches if you had an incision.   Any yellow, white, or green fluid coming from places where you had stitches or surgery.    Mood Problems (postpartum depression)    Many people feel sad or have mood changes after having a baby. But for some people, these mood swings are worse.     Call your provider right away if you feel so anxious or nervous that you can't care for yourself or your baby.    Preeclampsia (high blood pressure)    Even if you didn't have high blood pressure when you were pregnant,  you are at risk for the high blood pressure disease called preeclampsia. This risk can last up to 12 weeks after giving birth.     Call your provider if you have:   Pain on your right side under your rib cage  Sudden swelling in the hands and face    Remember: You know your body. If something doesn't feel right, get medical help.     For informational purposes only. Not to replace the advice of your health care provider. Copyright 2020 [place] [facility]. All rights reserved. Clinically reviewed by Danita Lee RNC-OB, MSN. Charitas [id number] - Rev 02/23.

## 2025-07-21 NOTE — CARE PLAN
Patient discharged at 4:15 PM via ambulation accompanied by spouse and staff. Prescriptions sent to patients preferred pharmacy. All belongings sent with patient.     Discharge instructions reviewed with Skyler. Listed belongings gathered and returned to patient.     Patient discharged to Home.     Surgical Patient   Surgical Procedures during stay: None  Did patient receive discharge instruction on wound care and recognition of infection symptoms? Yes - patient has 2nd degree tear with repair.    [facility]  Follow up appointment made:  Yes  Home and hospital aquired medications returned to patient: N/A  Patient reports pain was well managed at discharge: Yes

## 2025-07-21 NOTE — PLAN OF CARE
Goal Outcome Evaluation:      Plan of Care Reviewed With: patient, spouse    Overall Patient Progress: improvingOverall Patient Progress: improving    Outcome Evaluation: pt improving per plan of care

## 2025-07-21 NOTE — PLAN OF CARE
Face to face report given with opportunity to observe patient.    Report given to CAYETANO Rao RN   7/20/2025  7:00 AM

## 2025-07-21 NOTE — LACTATION NOTE
"                                     INPATIENT LACTATION CONSULT    Karen Garcia                                                                             [id number]    Consultation Date: 2025    Reason for Lactation Referral:routine lactation assessment    Baby's :25    Baby's Current Age:2 d  Baby's Gestational Age:41.3    Provider: ryan on call       Maternal History  Maternal History:unremarkable  Breast Surgery:No  Breast Changes During Pregnancy:No  Breast Feeding History:No  Delivery: with tears and repair  Maternal Medications:PNV, Tylenol, Ibuprofen, Oxycodone, Pepcid and Citrucel    Maternal Assessment  Breast Size: average  Nipple Appearance - Left: intact  Nipple Appearance - Right: intact  Nipple Erectility - Left: erect with stimulation  Nipple Erectility - Right: erect with stimulation  Areolas Compressibility: soft  Nipple Size: average  Milk Supply: colostrum    Infant Assessment  Birth Weight: 10 lb 4.6 oz  Current Weight:9 lb 8.9 oz  Weight Loss Percentage: -7.08%  Mouth: normal  Palate: normal  Jaw: normal  Tongue: normal  Frenulum: normal  Digital Suck Exam: not assessed    Feeding  No feeding session observed at this time. Lots of education reviewed.    Education  Following education covered with mom. States understanding and denies any questions or concerns.    exclusivity explained and encouraged to establish breastfeeding and order in milk   rooming-in encouraged with explanation of benefits  encourage skin to skin with explanation of benefits  expressed breast milk and lanolin to nipples for healing and comfort as needed  feeding positions  obtaining a deep latch  how to properly unlatching babe  hand expression  handling and storage of expressed milk  frequency of feedings (8-12 times in 24 hr period)  how to tell babe is getting enough  feeing cues  burping techniques  anticipatory guidance for \"baby's second night\"    Feeding Plan  Continue to feed on demand when "  elicits feeding cues with deep latch. Strive for 8-12 feeding sessions in a 24hr period. Will attempt to do as many feeding sessions skin to skin as possible. Follow-up support provided and OP lactation appt scheduled.      Eulalia Sarkar RN, RNC-OB, IBCLC  Lactation Consultant  Nick Bland

## 2025-07-21 NOTE — DISCHARGE SUMMARY
[facility] Discharge Summary   Karen Garcia  MRN# [id number]   Age: 40 year old YOB: 1985      Date of Admission: 2025   Date of Discharge: 25     Admitting Physician: Lainey Kim MD   Discharge Physician: Lainey Kim MD      Admission Diagnoses:   -  at 40w6d   - LGA fetus, extrapolated EFW ~ 4500 g  - Bilateral fetal pyelectasis, measuring up to 8 mm bilaterally  - AMA  - Failed GCT, passed GTT     Discharge Diagnosis:   - As above, s/p  at 41w1d  - LGA infant  - Postpartum hemorrhage due to lacerations  - Acute blood loss anemia  - Gestational hypertension      Procedures: , repair of second degree perineal laceration and periurethral lacerations, epidural     Discharge Medications:      Review of your medicines        START taking        Dose / Directions   oxyCODONE 5 MG tablet  Commonly known as: ROXICODONE  Used for:  (normal spontaneous vaginal delivery)      Dose: 5 mg  Take 1 tablet (5 mg) by mouth every 4 hours as needed for moderate pain.  Quantity: 6 tablet  Refills: 0            CONTINUE these medicines which have NOT CHANGED        Dose / Directions   prenatal multivitamin w/iron 27-0.8 MG tablet      Dose: 1 tablet  Take 1 tablet by mouth daily  Refills: 0     triamcinolone 0.1 % external cream  Commonly known as: KENALOG  Used for: Dermatitis      Apply topically 2 times daily  Quantity: 80 g  Refills: 1               Where to get your medicines        These medications were sent to [facility] [address] [address]      Phone: [phone]   oxyCODONE 5 MG tablet        Consultations: None     Brief History of Labor:   Karen Garcia, a 40 year old now  female was admitted at 40w6d for a scheduled induction of labor.  She received cervidil overnight followed by 2 doses of cytotec during the day and started laboring spontaneously overnight. She received an epidural for  pain control and pitocin was started for labor augmentation. AROM was performed and she became completely dilated. After pushing for approximately an hour, she delivered a viable male fetus in CHAPARRITA position. There was a loose nuchal cord was was delivered through and reduced afterwards.  The anterior (right) shoulder delivered with gentle downward traction. After over a minute, the cord was doubly clamped and cut and a segment of cord was collected in case needed for cord blood gasses. Prophylactic pitocin was started.  She started have gushes of bleeding before the placenta was fully detached.  The placenta did deliver with gentle traction and appeared intact with a 3VC. Inspection showed a midline periurethral laceration that extended almost all the way to the urethra and a second degree perineal laceration.  A catheter was placed to better identify the location of the urethra. There was brisk bleeding coming from both laceration sites. The periurethral laceration and second degree perineal laceration were repaired in the usual fashion with 3-0 vicryl. Excellent hemostasis was noted. Needle count correct. Infant and patient in delivery room in good and stable condition.      Patient's uterus had been firm the entire time after delivery of the placenta and atony was not felt to be the cause of her excess bleeding. Excess bleeding attributed to her lacerations and bleeding prior to delivery of the placenta.     Hospital Course:   The patient's hospital course was unremarkable. She received a dose of IV iron on PPD#1 and required small amounts of oxycodone to help with her perineal discomfort. On discharge, her pain was well controlled. Vaginal bleeding is similar to peak menstrual flow. Voiding without difficulty. Ambulating well and tolerating a normal diet. No fever. Breastfeeding well. Infant is stable. No bowel movement yet. She was discharged on post-partum day #2.      Post-partum hemoglobin: 9.9      Discharge  Instructions and Follow-Up:   Discharge diet: Regular diet  Discharge activity: Activity as tolerated, nothing in vagina for 6 weeks  Discharge follow-up: 2 and 6 week PP visit, 3-5 day Bp check   Wound care: TID sitz baths     Less than 30 minutes were spent on patient's discharge, over 50% of which was spent counseling.     Lainey Kim MD

## 2025-07-21 NOTE — PROGRESS NOTES
Medical record reviewed.  Met with care team. No apparent needs noted at this time. Care Transitions will remain available if needs arise.  RAMBO Jacques @[phone] July 21, 2025

## 2025-07-21 NOTE — PLAN OF CARE
Goal Outcome Evaluation:      Plan of Care Reviewed With: patient, spouse    Overall Patient Progress: improvingOverall Patient Progress: improving    Outcome Evaluation: All goals met. Patient to discharge later today.      Data: Vital signs within normal limits. Postpartum checks within normal limits - see flow record. Patient eating and drinking normally. Patient able to empty bladder independently and is up ambulating. No apparent signs of infection. Perineal tears healing well. Patient performing self cares and is able to care for infant.    Action: Patient medicated during the shift for pain. See MAR. Patient reassessed within 1 hour after each medication and pain was improved - patient stated she was comfortable. Patient education done about postpartum care, breastfeeding, signs and symptoms to call provider with, BP monitoring 2x daily, PP depression signs/symptoms, and reviewed postpartum/ care SAM ortega. See flow record.    Response: Positive attachment behaviors observed with infant. Support persons Elliot present.     Plan: Anticipate discharge this afternoon.

## 2025-07-22 RX ORDER — OXYCODONE HYDROCHLORIDE 5 MG/1
5 TABLET ORAL EVERY 6 HOURS PRN
Qty: 12 TABLET | Refills: 0 | Status: SHIPPED | OUTPATIENT
Start: 2025-07-22 | End: 2025-07-25

## 2025-07-23 ENCOUNTER — HOSPITAL ENCOUNTER (OUTPATIENT)
Dept: OBGYN | Facility: HOSPITAL | Age: 40
Discharge: HOME | End: 2025-07-23
Attending: OBSTETRICS & GYNECOLOGY
Payer: MEDICAID

## 2025-07-23 ENCOUNTER — LACTATION ENCOUNTER (OUTPATIENT)
Dept: OBGYN | Facility: HOSPITAL | Age: 40
End: 2025-07-23

## 2025-07-23 VITALS — DIASTOLIC BLOOD PRESSURE: 72 MMHG | SYSTOLIC BLOOD PRESSURE: 134 MMHG

## 2025-07-23 PROCEDURE — G0463 HOSPITAL OUTPT CLINIC VISIT: HCPCS

## 2025-07-23 NOTE — LACTATION NOTE
This note was copied from a baby's chart.  Outpatient Lactation Visit    Balaji Garcia                                                                                                   [id number]      Consultation Date: 2025     Reason for Lactation Referral: routine follow-up    Baby's :25    Baby's Current Age: 4d  Baby's Gestation Age: 41.1    Primary Care Provider: Dr. Farias    History of Present Illness: none    MATERNAL HISTORY   History of Breast Surgery: No.  Breast Changes During Pregnancy:No  Breast Feeding History: No  Maternal Meds:PNV, Vit D, Tylenol, Ibuprofen and Oxy  Pregnancy complications:AMA  Delivery: with a tear and repair    MATERNAL ASSESSMENT    Breast Size: average, symmetrical, soft after feeding and filling prior to feeding  Nipple Appearance - Left: intact with no breakdown noted  Nipple Appearance - Right: intact with no breakdown noted  Nipple Erectility - Left: erect with stimulation  Nipple Erectility - Right: erect with stimulation  Areolas Compressibility: soft  Nipple Size: average  Milk Supply: mature    INFANT ASSESSMENT    Oral Anatomy  Mouth: normal  Palate: normal  Jaw: normal  Tongue: normal  Frenulum: normal   Digital Suck Exam: root    FEEDING   Feeding Time: 1407 for 9 minutes  Position: right breast - cross cradle  Effort to Latch: awake and alert, latched easily  Duration of Breast Feeding: Right Breast: 9; Left Breast: 0  Results: excellent breast feed    Volume of Intake:  Birth Weight: 10 lb 4.6 oz  Discharge from Hospital after delivery weight: 9 lb 8.9oz   TODAY 2025  Pre-Weight: 9 lb 8.5oz  Post-Weight: 9 lb 10 oz  Total Intake: 1.5 oz  Output: 3+ soil diapers in last 24 hrs, 3+ wet diapers in last 24 hrs    LATCH SCORE:  Latch:2 - Good Latch  Audible Swallowin - Spontaneous & frequent  Type of Nipple:2 - Everted  Comfort:2 - Soft, Nontender  Hold:1 - Min. Assist  Total LATCH Score:9/10    Mom states she thinks milk started  to come in this morning. States nursingsis going fairly well at home. Babe eats every 1-3 hrs. Noted that this am babe was nursing every hour. Appears satisfied after feedings.    Naked pre feeding weight obtained. Minimal assistance needed with positioning and latching on right breast. Mom able to latch babe after a few tries an a few tips on positioning of hand placement and breast compression. Once on good sucking/swallowing noted. Babe nurse well for 9 min. Unlatched self, appears satisfied and retains entire feeding. Naked post feeding weight obtained. Babe noted to transfer 1.5 oz with feeding.     Education reviewed and states understanding and all questions answered. Contact information given and encouraged to call should any questions arise.     EDUCATION   Exclusivity explained and encouraged in the early weeks to establish breastfeeding and order in milk supply.  Deep latch explained for proper positioning of breast in infant's mouth, maximizing milk transfer and comfort.  Hand expression taught and return demonstration observed with mature milk present.  Rooming-in encouraged with explanation of the benefits.  Continue to apply expressed breast milk and Lanolin cream to nipples after feedings for healing and comfort.  Postpartum breastfeeding assessment completed and education provided.  Items included in the education are:   proper positioning and latch  maternal nutritional needs  s/s of plugged ducts/mastitis  Feeding cues  sterilization and cleaning of pumping materials and bottles  effectiveness of feeding  manual expression  handling and storing breastmilk  maintenance of breastfeeding for the first 6 months  sign/symptoms of infant feeding issues requiring referral to qualified health care provider    FEEDING PLAN   Continue with current feeding plan. Feed on demand (8-12 feedings/24 hr period) when  elicits feeding cues with deep latch.       FOLLOW-UP   Reassurance and encouragement  provided in regard to mom's concerns about milk supply.  Follow-up support information provided.  Parents plan to keep  Well-Child Check with Dr. Farias Friday for further support and monitoring.      Face-to-face Time: 45 minutes with assessment and education.    Eulalia Sarkar RN, RNC-OB, IBCLC  Lactation Consultant  Nick Bland

## 2025-08-01 ENCOUNTER — PRENATAL OFFICE VISIT (OUTPATIENT)
Dept: OBGYN | Facility: OTHER | Age: 40
End: 2025-08-01
Attending: NURSE PRACTITIONER
Payer: MEDICAID

## 2025-08-01 VITALS
WEIGHT: 223.3 LBS | HEIGHT: 72 IN | BODY MASS INDEX: 30.25 KG/M2 | SYSTOLIC BLOOD PRESSURE: 110 MMHG | DIASTOLIC BLOOD PRESSURE: 76 MMHG | OXYGEN SATURATION: 99 % | HEART RATE: 84 BPM

## 2025-08-01 LAB
HGB BLD-MCNC: 13.8 G/DL (ref 11.7–15.7)
MCV RBC AUTO: 94 FL (ref 78–100)

## 2025-08-01 PROCEDURE — 36415 COLL VENOUS BLD VENIPUNCTURE: CPT | Mod: ZL | Performed by: NURSE PRACTITIONER

## 2025-08-01 PROCEDURE — 3078F DIAST BP <80 MM HG: CPT | Performed by: NURSE PRACTITIONER

## 2025-08-01 PROCEDURE — 99207 PR NO CHARGE LOS: CPT | Performed by: NURSE PRACTITIONER

## 2025-08-01 PROCEDURE — 85018 HEMOGLOBIN: CPT | Mod: ZL | Performed by: NURSE PRACTITIONER

## 2025-08-01 PROCEDURE — G0463 HOSPITAL OUTPT CLINIC VISIT: HCPCS

## 2025-08-01 PROCEDURE — 0503F POSTPARTUM CARE VISIT: CPT | Performed by: NURSE PRACTITIONER

## 2025-08-01 PROCEDURE — 3074F SYST BP LT 130 MM HG: CPT | Performed by: NURSE PRACTITIONER

## (undated) DEVICE — LABEL STERILE PREPRINTED FOR OR FRRH01-2M

## (undated) DEVICE — SUCTION CANNULA UTERINE 08MM STR 022208-10

## (undated) DEVICE — LIGHT HANDLE COVER

## (undated) DEVICE — SUTURE-PROLENE 3-0 PS-1 8663G

## (undated) DEVICE — SOL WATER IRRIG 1000ML BOTTLE 2F7114

## (undated) DEVICE — DRAPE-U DRAPE SPLIT SHEET 72" X 122"

## (undated) DEVICE — COVER LT HANDLE 2/PK 5160-2FG

## (undated) DEVICE — TRAY PREP DRY SKIN SCRUB 067

## (undated) DEVICE — PACK-BASIN SET-UP

## (undated) DEVICE — DRSG NON ADHERING 3 X 8 TELFA 1238

## (undated) DEVICE — SUTURE-VICRYL 3-0 SH J416H

## (undated) DEVICE — CAUTERY PAD-POLYHESIVE II ADULT

## (undated) DEVICE — LABEL-STERILE PREPRINTED FOR OR

## (undated) DEVICE — IRRIGATION-H2O 1000ML

## (undated) DEVICE — IRRIGATION-NACL 1000ML

## (undated) DEVICE — STAPLER-SKIN 35 WIDE STAPLES

## (undated) DEVICE — SCD SLEEVE-KNEE REG.

## (undated) DEVICE — GLV 8.5 BIOGEL LATEX 30485-01

## (undated) DEVICE — BIN-UROLOGY / CYSTO BN47

## (undated) DEVICE — Device

## (undated) DEVICE — CATH SELF CATH MALE 14FR 414

## (undated) DEVICE — LUBRICANT JELLY 2OZ. TUBE

## (undated) DEVICE — SUCTION CANNULA UTERINE 16MM CVD 022116

## (undated) DEVICE — CANISTER-SUCTION 2000CC

## (undated) DEVICE — GLV-8.0 PROTEXIS PI CLASSIC LF/PF

## (undated) DEVICE — SPECIMEN TRAP VACUUM SUCTION 003984-901

## (undated) DEVICE — CURETTES-D&C 9MM ST

## (undated) DEVICE — TUBING VACUUM COLLECTION 6FT 23116

## (undated) DEVICE — PACK BASIN SET UP SUTCNBSBBA

## (undated) DEVICE — MARKER RADIOLOGY HYPOALLERGENIC 2MM N-SPOT 610

## (undated) DEVICE — CAUTERY-MICROFINE NEEDLE

## (undated) DEVICE — SOL NACL 0.9% IRRIG 1000ML BOTTLE 2F7124

## (undated) DEVICE — TRAY-SKIN PREP POVIDONE/IODINE

## (undated) DEVICE — PACK-LAPAROTOMY-CUSTOM

## (undated) DEVICE — PAD PERI INDIV WRAP 11" 2022A

## (undated) DEVICE — PACK GYN CYSTO CUSTOM SMA32GCMBF

## (undated) DEVICE — BLADE-SCALPEL #15

## (undated) RX ORDER — FENTANYL CITRATE 50 UG/ML
INJECTION, SOLUTION INTRAMUSCULAR; INTRAVENOUS
Status: DISPENSED
Start: 2023-06-07

## (undated) RX ORDER — DEXAMETHASONE SODIUM PHOSPHATE 10 MG/ML
INJECTION, SOLUTION INTRAMUSCULAR; INTRAVENOUS
Status: DISPENSED
Start: 2023-06-07

## (undated) RX ORDER — PROPOFOL 10 MG/ML
INJECTION, EMULSION INTRAVENOUS
Status: DISPENSED
Start: 2023-06-07

## (undated) RX ORDER — TRANEXAMIC ACID 100 MG/ML
INJECTION, SOLUTION INTRAVENOUS
Status: DISPENSED
Start: 2023-06-07

## (undated) RX ORDER — ONDANSETRON 2 MG/ML
INJECTION INTRAMUSCULAR; INTRAVENOUS
Status: DISPENSED
Start: 2023-06-07

## (undated) RX ORDER — FENTANYL CITRATE 50 UG/ML
INJECTION, SOLUTION INTRAMUSCULAR; INTRAVENOUS
Status: DISPENSED
Start: 2021-05-10

## (undated) RX ORDER — PROPOFOL 10 MG/ML
INJECTION, EMULSION INTRAVENOUS
Status: DISPENSED
Start: 2021-05-10